# Patient Record
Sex: FEMALE | Race: WHITE | Employment: OTHER | URBAN - METROPOLITAN AREA
[De-identification: names, ages, dates, MRNs, and addresses within clinical notes are randomized per-mention and may not be internally consistent; named-entity substitution may affect disease eponyms.]

---

## 2017-03-28 DIAGNOSIS — F41.9 ANXIETY: ICD-10-CM

## 2017-03-29 RX ORDER — PAROXETINE HYDROCHLORIDE 20 MG/1
TABLET, FILM COATED ORAL
Qty: 90 TAB | Refills: 1 | Status: SHIPPED | OUTPATIENT
Start: 2017-03-29 | End: 2017-11-05 | Stop reason: SDUPTHER

## 2017-10-23 ENCOUNTER — OFFICE VISIT (OUTPATIENT)
Dept: INTERNAL MEDICINE CLINIC | Age: 82
End: 2017-10-23

## 2017-10-23 VITALS
OXYGEN SATURATION: 97 % | WEIGHT: 139.38 LBS | SYSTOLIC BLOOD PRESSURE: 150 MMHG | BODY MASS INDEX: 30.07 KG/M2 | HEIGHT: 57 IN | DIASTOLIC BLOOD PRESSURE: 87 MMHG | RESPIRATION RATE: 18 BRPM | HEART RATE: 60 BPM | TEMPERATURE: 98.7 F
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
## 2017-10-23 DIAGNOSIS — R26.81 GAIT INSTABILITY: ICD-10-CM

## 2017-10-23 RX ORDER — ACETAMINOPHEN 500 MG
2000 TABLET ORAL DAILY
COMMUNITY
Start: 2017-10-23 | End: 2020-06-04

## 2017-10-23 NOTE — MR AVS SNAPSHOT
Visit Information Date & Time Provider Department Dept. Phone Encounter #  
 10/23/2017  1:15 PM Aurelia Patel MD [facility] [address] [id number] Follow-up Instructions Return if symptoms worsen or fail to improve. Upcoming Health Maintenance Date Due  
 MEDICARE YEARLY EXAM 4/12/2017 INFLUENZA AGE 9 TO ADULT 8/1/2017 GLAUCOMA SCREENING Q2Y 2/8/2018 DTaP/Tdap/Td series (2 - Td) 1/1/2019 Allergies as of 10/23/2017  Review Complete On: 10/23/2017 By: Deidra Fraga LPN Severity Noted Reaction Type Reactions Demerol [Meperidine]  12/23/2011    Other (comments) GI distress Doxycycline  12/23/2011    Hives Novocain [Procaine]  04/06/2015    Swelling Current Immunizations  Reviewed on 10/17/2016 Name Date Influenza Vaccine Whole 10/1/2010 Pneumococcal Conjugate (PCV-13) 4/12/2016 Pneumococcal Vaccine (Unspecified Type) 4/24/1993 TDAP Vaccine 1/1/2009 Not reviewed this visit You Were Diagnosed With   
  
 Codes Comments Fall (on) (from) other stairs and steps, initial encounter    -  Primary ICD-10-CM: W10. Richar Hough ICD-9-CM: E880.9 Gait instability     ICD-10-CM: R26.81 
ICD-9-CM: 407. 2 Vitals BP Pulse Temp Resp Height(growth percentile) Weight(growth percentile) 150/87 (BP 1 Location: Left arm, BP Patient Position: Sitting) 60 98.7 °F (37.1 °C) (Oral) 18 4' 8.89\" (1.445 m) 139 lb 6 oz (63.2 kg) SpO2 BMI OB Status Smoking Status 97% 30.28 kg/m2 Hysterectomy Never Smoker Vitals History BMI and BSA Data Body Mass Index Body Surface Area  
 30.28 kg/m 2 1.59 m 2 [facility]erred [facility]acy [facility] [address] [phone] Your Updated Medication List  
  
   
This list is accurate as of: 10/23/17  1:40 PM.  Always use your most recent med list.  
  
  
  
  
 acetaminophen 500 mg tablet Commonly known as:  TYLENOL Take  by mouth every six (6) hours as needed for Pain. calcium-cholecalciferol (d3) 600-125 mg-unit Tab Take  by mouth as needed. cyanocobalamin 1,000 mcg tablet Take 1,000 mcg by mouth as needed. ibuprofen 200 mg tablet Commonly known as:  MOTRIN Take  by mouth three (3) times daily as needed. OCUVEL PO Take  by mouth. PARoxetine 20 mg tablet Commonly known as:  PAXIL  
take 1 tablet by mouth once daily We Performed the Following REFERRAL TO OCCUPATIONAL THERAPY [REF53 Custom] REFERRAL TO PHYSICAL THERAPY [MEY24 Custom] Follow-up Instructions Return if symptoms worsen or fail to improve. Referral Information Referral ID Referred By Referred To  
  
 [id number] Romaine Portillo Not Available Visits Status Start Date End Date 1 New Request 10/23/17 10/23/18 If your referral has a status of pending review or denied, additional information will be sent to support the outcome of this decision. Referral ID Referred By Referred To  
 [id number] Romaine Portillo Not Available Visits Status Start Date End Date 1 New Request 10/23/17 10/23/18 If your referral has a status of pending review or denied, additional information will be sent to support the outcome of this decision. Introducing [facility]! Ary Garcia introduces SIM Digital patient portal. Now you can access parts of your medical record, email your doctor's office, and request medication refills online. 1. In your internet browser, go to [email or web address] 2. Click on the First Time User? Click Here link in the Sign In box. You will see the New Member Sign Up page. 3. Enter your [employer] Access Code exactly as it appears below. You will not need to use this code after youve completed the sign-up process.  If you do not sign up before the expiration date, you must request a new code. · [employer] Access Code: [email or web address] Expires: 1/21/2018  1:40 PM 
 
4. Enter the last four digits of your Social Security Number (xxxx) and Date of Birth (mm/dd/yyyy) as indicated and click Submit. You will be taken to the next sign-up page. 5. Create a [employer] [facility] ID. This will be your [employer] login ID and cannot be changed, so think of one that is secure and easy to remember. 6. Create a [employer] password. You can change your password at any time. 7. Enter your Password Reset Question and Answer. This can be used at a later time if you forget your password. 8. Enter your e-mail address. You will receive e-mail notification when new information is available in [address] 9. Click Sign Up. You can now view and download portions of your medical record. 10. Click the Download Summary menu link to download a portable copy of your medical information. If you have questions, please visit the Frequently Asked Questions section of the [employer] website. Remember, [employer] [facility] is NOT to be used for urgent needs. For medical emergencies, dial 911. Now available from your iPhone and Android! Please provide this summary of care documentation to your next provider. Your primary care clinician is listed as Yoel Forrest. If you have any questions after today's visit, please call [phone].

## 2017-10-23 NOTE — PROGRESS NOTES
HISTORY OF PRESENT ILLNESS  Giles Rossi is a 80 y.o. female. HPI  Problem follow up:  Giles Rossi returns for follow up visit regarding GLF. she was seen 5 days ago in [facility] ER diagnosed with same and treated with dermabond. Workup was significant for negative CT head, . Notes, labs, studies, imaging related to this problem during prior visit were not available . Since that visit, she has improved. she has been compliant with prescribed treatment. Residual symptoms include: no pain. Residual tenderness over laceration L temple,  New issues associated with this problem include: none. Patient Active Problem List   Diagnosis Code    Osteoarthritis M19.90    At risk for falls Z91.81    Hearing loss H91.90    Anxiety F41.9    H/O thyroid disease Z86.39    Abnormal MMSE F99    Osteopenia M85.80    Macular degeneration H35.30    DJD (degenerative joint disease), lumbar M47.816    Seasonal affective disorder (HCC) F33.9    Advanced care planning/counseling discussion Z71.89     Past Medical History:   Diagnosis Date    Arthritis     Asthma     allergies    History of fibromyalgia     Senile osteopenia     Spinal stenosis     Thyroid disease     hypothyroidism     Allergies   Allergen Reactions    Demerol [Meperidine] Other (comments)     GI distress    Doxycycline Hives    Novocain [Procaine] Swelling     Current Outpatient Prescriptions on File Prior to Visit   Medication Sig Dispense Refill    PARoxetine (PAXIL) 20 mg tablet take 1 tablet by mouth once daily 90 Tab 1    cyanocobalamin 1,000 mcg tablet Take 1,000 mcg by mouth as needed.  calcium-cholecalciferol, d3, 600-125 mg-unit tab Take  by mouth as needed.  acetaminophen (TYLENOL) 500 mg tablet Take  by mouth every six (6) hours as needed for Pain.  ibuprofen (MOTRIN) 200 mg tablet Take  by mouth three (3) times daily as needed. No current facility-administered medications on file prior to visit. Social History   Substance Use Topics    Smoking status: Never Smoker    Smokeless tobacco: Never Used    Alcohol use No         ROS    Physical Exam   Constitutional: She appears well-developed and well-nourished. No distress. /87 (BP 1 Location: Left arm, BP Patient Position: Sitting)  Pulse 60  Temp 98.7 °F (37.1 °C) (Oral)   Resp 18  Ht 4' 8.89\" (1.445 m)  Wt 139 lb 6 oz (63.2 kg)  SpO2 97%  BMI 30.28 kg/m2Body mass index is 30.28 kg/(m^2). HENT:   Mouth/Throat: Oropharynx is clear and moist.   Neck: No JVD present. Carotid bruit is not present. Cardiovascular: Normal rate, regular rhythm, normal heart sounds and intact distal pulses. Pulmonary/Chest: Effort normal and breath sounds normal.   Musculoskeletal: She exhibits no edema. Neurological: She is alert. She displays a negative Romberg sign. Gait (slow/ cautious) abnormal.   Get up and go test - 15 sec. Able to stand from chair without using UE's. Skin: Skin is warm and dry. She is not diaphoretic. Nursing note and vitals reviewed. ASSESSMENT and PLAN  Diagnoses and all orders for this visit:    1. Fall (on) (from) other stairs and steps, initial encounter  -     REFERRAL TO PHYSICAL THERAPY  -     REFERRAL TO OCCUPATIONAL THERAPY  Increase VIT D intake. 2. Gait instability  -     REFERRAL TO PHYSICAL THERAPY  -     REFERRAL TO OCCUPATIONAL THERAPY      Follow-up Disposition:  Return if symptoms worsen or fail to improve.

## 2017-11-05 DIAGNOSIS — F41.9 ANXIETY: ICD-10-CM

## 2017-11-06 RX ORDER — PAROXETINE HYDROCHLORIDE 20 MG/1
TABLET, FILM COATED ORAL
Qty: 90 TAB | Refills: 1 | Status: SHIPPED | OUTPATIENT
Start: 2017-11-06 | End: 2018-06-15 | Stop reason: SDDI

## 2018-06-15 ENCOUNTER — OFFICE VISIT (OUTPATIENT)
Dept: INTERNAL MEDICINE CLINIC | Age: 83
End: 2018-06-15

## 2018-06-15 VITALS
TEMPERATURE: 97.9 F | HEIGHT: 57 IN | RESPIRATION RATE: 18 BRPM | DIASTOLIC BLOOD PRESSURE: 77 MMHG | OXYGEN SATURATION: 96 % | SYSTOLIC BLOOD PRESSURE: 152 MMHG | BODY MASS INDEX: 28.26 KG/M2 | HEART RATE: 68 BPM | WEIGHT: 131 LBS

## 2018-06-15 DIAGNOSIS — F41.9 ANXIETY: ICD-10-CM

## 2018-06-15 DIAGNOSIS — Z00.00 MEDICARE ANNUAL WELLNESS VISIT, SUBSEQUENT: Primary | ICD-10-CM

## 2018-06-15 DIAGNOSIS — K59.00 CONSTIPATION, UNSPECIFIED CONSTIPATION TYPE: ICD-10-CM

## 2018-06-15 DIAGNOSIS — Z91.81 AT RISK FOR FALLS: ICD-10-CM

## 2018-06-15 DIAGNOSIS — H91.13 PRESBYCUSIS OF BOTH EARS: ICD-10-CM

## 2018-06-15 DIAGNOSIS — Z78.0 POSTMENOPAUSAL: ICD-10-CM

## 2018-06-15 DIAGNOSIS — M85.80 OSTEOPENIA, UNSPECIFIED LOCATION: ICD-10-CM

## 2018-06-15 DIAGNOSIS — R45.4 IRRITABILITY AND ANGER: ICD-10-CM

## 2018-06-15 RX ORDER — PAROXETINE 30 MG/1
30 TABLET, FILM COATED ORAL DAILY
Qty: 90 TAB | Refills: 1 | Status: SHIPPED | OUTPATIENT
Start: 2018-06-15 | End: 2018-07-19 | Stop reason: SDUPTHER

## 2018-06-15 RX ORDER — DOCUSATE SODIUM 100 MG/1
100 CAPSULE, LIQUID FILLED ORAL 2 TIMES DAILY
Qty: 60 CAP | Refills: 2 | COMMUNITY
Start: 2018-06-15

## 2018-06-15 NOTE — MR AVS SNAPSHOT
[facility] 
 
 
 [address] 
[phone] Patient: Karla Pinto MRN: [id number] SXC:[birth year] Visit Information Date & Time Provider Department Dept. Phone Encounter #  
 6/15/2018  2:15 PM Marce Mota MD [facility] [address] [id number] Follow-up Instructions Return in about 6 months (around 12/15/2018). Upcoming Health Maintenance Date Due  
 GLAUCOMA SCREENING Q2Y 2/8/2018 MEDICARE YEARLY EXAM 3/14/2018 Influenza Age 5 to Adult 8/1/2018 DTaP/Tdap/Td series (2 - Td) 1/1/2019 Allergies as of 6/15/2018  Review Complete On: 6/15/2018 By: Amando Gama LPN Severity Noted Reaction Type Reactions Demerol [Meperidine]  12/23/2011    Other (comments) GI distress Doxycycline  12/23/2011    Hives Novocain [Procaine]  04/06/2015    Swelling Current Immunizations  Reviewed on 10/17/2016 Name Date Influenza Vaccine Whole 10/1/2010 Pneumococcal Conjugate (PCV-13) 4/12/2016 Pneumococcal Vaccine (Unspecified Type) 4/24/1993 TDAP Vaccine 1/1/2009 Not reviewed this visit You Were Diagnosed With   
  
 Codes Comments Medicare annual wellness visit, subsequent    -  Primary ICD-10-CM: Z00.00 ICD-9-CM: V70.0 At risk for falls     ICD-10-CM: Z91.81 
ICD-9-CM: V15.88 Presbycusis of both ears     ICD-10-CM: H91.13 
ICD-9-CM: 388.01 Constipation, unspecified constipation type     ICD-10-CM: K59.00 ICD-9-CM: 564.00 Anxiety     ICD-10-CM: F41.9 ICD-9-CM: 300.00 Osteopenia, unspecified location     ICD-10-CM: M85.80 ICD-9-CM: 733.90 Irritability and anger     ICD-10-CM: R45.4 ICD-9-CM: 799.22 Postmenopausal     ICD-10-CM: Z78.0 ICD-9-CM: V49.81 Vitals BP Pulse Temp Resp Height(growth percentile) Weight(growth percentile)  152/77 (BP 1 Location: Left arm, BP Patient Position: Sitting) 68 97.9 °F (36.6 °C) (Oral) 18 4' 8.89\" (1.445 m) 131 lb (59.4 kg) SpO2 BMI OB Status Smoking Status 96% 28.46 kg/m2 Hysterectomy Never Smoker Vitals History BMI and BSA Data Body Mass Index Body Surface Area  
 28.46 kg/m 2 1.54 m 2 Your Updated Medication List  
  
   
This list is accurate as of 6/15/18  2:34 PM.  Always use your most recent med list.  
  
  
  
  
 acetaminophen 500 mg tablet Commonly known as:  TYLENOL Take  by mouth every six (6) hours as needed for Pain. calcium-cholecalciferol (d3) 600-125 mg-unit Tab Take  by mouth as needed. Cholecalciferol (Vitamin D3) 2,000 unit Cap capsule Commonly known as:  VITAMIN D3 Take 2,000 Units by mouth daily. cyanocobalamin 1,000 mcg tablet Take 1,000 mcg by mouth as needed. docusate sodium 100 mg capsule Commonly known as:  Grey Guppy Take 1 Cap by mouth two (2) times a day. ibuprofen 200 mg tablet Commonly known as:  MOTRIN Take  by mouth three (3) times daily as needed. OCUVEL PO Take  by mouth. PARoxetine 30 mg tablet Commonly known as:  PAXIL Take 1 Tab by mouth daily. Prescriptions Printed Refills PARoxetine (PAXIL) 30 mg tablet 1 Sig: Take 1 Tab by mouth daily. Class: Print Route: Oral  
  
Follow-up Instructions Return in about 6 months (around 12/15/2018). To-Do List   
 06/29/2018 Imaging:  DEXA BONE DENSITY STUDY AXIAL Introducing [facility]! Dwain Chi introduces Rapid Mobile patient portal. Now you can access parts of your medical record, email your doctor's office, and request medication refills online. 1. In your internet browser, go to [email or web address] 2. Click on the First Time User? Click Here link in the Sign In box. You will see the New Member Sign Up page. 3. Enter your [employer] Access Code exactly as it appears below.  You will not need to use this code after youve completed the sign-up process. If you do not sign up before the expiration date, you must request a new code. · [employer] Access Code: [email or web address] Expires: 9/13/2018  2:34 PM 
 
4. Enter the last four digits of your Social Security Number (xxxx) and Date of Birth (mm/dd/yyyy) as indicated and click Submit. You will be taken to the next sign-up page. 5. Create a [employer] ID. This will be your [employer] login ID and cannot be changed, so think of one that is secure and easy to remember. 6. Create a [employer] password. You can change your password at any time. 7. Enter your Password Reset Question and Answer. This can be used at a later time if you forget your password. 8. Enter your e-mail address. You will receive e-mail notification when new information is available in [address] 9. Click Sign Up. You can now view and download portions of your medical record. 10. Click the Download Summary menu link to download a portable copy of your medical information. If you have questions, please visit the Frequently Asked Questions section of the [employer] website. Remember, [employer] is NOT to be used for urgent needs. For medical emergencies, dial 911. Now available from your iPhone and Android! Please provide this summary of care documentation to your next provider. Your primary care clinician is listed as [facility]. If you have any questions after today's visit, please call [phone].

## 2018-06-15 NOTE — PROGRESS NOTES
HISTORY OF PRESENT ILLNESS  Irlanda Bneton is a 80 y.o. female. HPI  This is a Subsequent Medicare Annual Wellness Visit providing Personalized Prevention Plan Services (PPPS) (Performed 12 months after initial AWV and PPPS )    Patient Active Problem List   Diagnosis Code    Osteoarthritis M19.90    At risk for falls Z91.81    Hearing loss H91.90    Anxiety F41.9    H/O thyroid disease Z86.39    Abnormal MMSE F99    Osteopenia M85.80    Macular degeneration H35.30    DJD (degenerative joint disease), lumbar M47.816    Seasonal affective disorder ([address] F33.9    Advanced care planning/counseling discussion Z70.80    Presbycusis of both ears H91.13     Past Surgical History:   Procedure Laterality Date    HX HEENT  3/2008    cat. ext. O.DMaude Burn HX GRAHAM AND BSO  1984    cervical Ca     Social History     Social History    Marital status:      Spouse name: N/A    Number of children: N/A    Years of education: N/A     Occupational History    Not on file. Social History Main Topics    Smoking status: Never Smoker    Smokeless tobacco: Never Used    Alcohol use No    Drug use: No    Sexual activity: Not Currently     Other Topics Concern    Not on file     Social History Narrative     Family History   Problem Relation Age of Onset    Heart Disease Mother     Stroke Father      Current Outpatient Prescriptions   Medication Sig    docusate sodium (COLACE) 100 mg capsule Take 1 Cap by mouth two (2) times a day.  PARoxetine (PAXIL) 30 mg tablet Take 1 Tab by mouth daily.  FA/VIT C/E/ZINC/COPPER/LUT/TONI (OCUVEL PO) Take  by mouth.  Cholecalciferol, Vitamin D3, (VITAMIN D3) 2,000 unit cap capsule Take 2,000 Units by mouth daily.  cyanocobalamin 1,000 mcg tablet Take 1,000 mcg by mouth as needed.  calcium-cholecalciferol, d3, 600-125 mg-unit tab Take  by mouth as needed.  acetaminophen (TYLENOL) 500 mg tablet Take  by mouth every six (6) hours as needed for Pain.     ibuprofen (MOTRIN) 200 mg tablet Take  by mouth three (3) times daily as needed. No current facility-administered medications for this visit. Allergies   Allergen Reactions    Demerol [Meperidine] Other (comments)     GI distress    Doxycycline Hives    Novocain [Procaine] Swelling     Immunization History   Administered Date(s) Administered    Influenza Vaccine Whole 10/01/2010    Pneumococcal Conjugate (PCV-13) 04/12/2016    Pneumococcal Vaccine (Unspecified Type) 04/24/1993    TDAP Vaccine 01/01/2009       Depression scale assessment:  PHQ over the last two weeks 4/11/2016   Little interest or pleasure in doing things Not at all   Feeling down, depressed or hopeless Not at all   Total Score PHQ 2 0       Alcohol screening assessment  You do not drink alcohol or very rarely. Functional assessment/ safety  Hearing Loss   The patient wears hearing aids. - but not effective. She declines new hearing aids per daughter    Activities of Daily Living   Partial assistance. Requires assistance with: no ADLs    Fall Risk     Fall Risk Assessment, last 12 mths 10/23/2017   Able to walk? Yes   Fall in past 12 months? Yes   Fall with injury? Yes   Number of falls in past 12 months 1   Fall Risk Score 2       Abuse Risk:  Patient is not abused    Advanced Medical Directive screening:  Natalio Stover does not have an AMD on file in this chart. ( If not, She will provide for us to copy to chart or he has been counseled on the need for this to assist with decision making should they be incapacitated due to illness and the steps necessary to draw up this document.)    Health maintenance hx includes:  Exercise: not active. Diet: not attempting to follow a low fat, low cholesterol diet      Review of Systems   Constitutional: Negative for chills, fever, malaise/fatigue and weight loss. HENT: Negative. Respiratory: Negative. Cardiovascular: Negative.     Gastrointestinal: Positive for abdominal pain and constipation. Negative for blood in stool, diarrhea, heartburn, nausea and vomiting. Genitourinary: Negative. Musculoskeletal: Positive for falls. Negative for back pain, joint pain and neck pain. Psychiatric/Behavioral: Negative for depression. Physical Exam   Constitutional: She appears well-developed and well-nourished. No distress. /77 (BP 1 Location: Left arm, BP Patient Position: Sitting)  Pulse 68  Temp 97.9 °F (36.6 °C) (Oral)   Resp 18  Ht 4' 8.89\" (1.445 m)  Wt 131 lb (59.4 kg)  SpO2 96%  BMI 28.46 kg/m2Body mass index is 28.46 kg/(m^2). HENT:   Mouth/Throat: Oropharynx is clear and moist.   Neck: No JVD present. Carotid bruit is not present. Cardiovascular: Normal rate, regular rhythm, normal heart sounds and intact distal pulses. Pulmonary/Chest: Effort normal. No accessory muscle usage. No respiratory distress. She has decreased breath sounds. She has no wheezes. She has no rhonchi. She has no rales. Abdominal: Soft. Normal appearance. She exhibits no distension and no mass. Bowel sounds are decreased. There is no hepatosplenomegaly. There is no tenderness. There is no rigidity and no guarding. Musculoskeletal: She exhibits no edema. Neurological: She is alert. Skin: Skin is warm and dry. She is not diaphoretic. Nursing note and vitals reviewed. ASSESSMENT and PLAN    ICD-10-CM ICD-9-CM    1. Medicare annual wellness visit, jan Arriazaalida was counseled on age-appropriate/ guideline-based risk prevention behaviors and screening for a 80y.o. year old   female . We also discussed adjustments in screening based on family history if necessary. Printed instructions for preventative screening guidelines and healthy behaviors given to patient with after visit summary. Z00.00 V70.0    2. At risk for falls- encouraged faithful use of rolling walker. She declined OT/ PT consult in home Z91.81 V15.88    3.  Presbycusis of both ears -she declines further management H91.13 388.01    4. Constipation, unspecified constipation type -mild , no abdominal findings on exam K59.00 564.00 docusate sodium (COLACE) 100 mg capsule   5. Anxiety F41.9 300.00    6. Osteopenia, unspecified location M85.80 733.90 DEXA BONE DENSITY STUDY AXIAL   7. Irritability and anger - some persistant symptoms per family. Will increase SSRI R45.4 799.22 PARoxetine (PAXIL) 30 mg tablet   8. Postmenopausal Z78.0 V49.81      Follow-up Disposition:  Return in about 6 months (around 12/15/2018).

## 2018-07-19 DIAGNOSIS — R45.4 IRRITABILITY AND ANGER: ICD-10-CM

## 2018-07-19 DIAGNOSIS — F41.9 ANXIETY: ICD-10-CM

## 2018-07-19 RX ORDER — PAROXETINE HYDROCHLORIDE 20 MG/1
TABLET, FILM COATED ORAL
Qty: 90 TAB | Refills: 1 | OUTPATIENT
Start: 2018-07-19

## 2018-07-19 RX ORDER — PAROXETINE 30 MG/1
30 TABLET, FILM COATED ORAL DAILY
Qty: 90 TAB | Refills: 1 | Status: SHIPPED | OUTPATIENT
Start: 2018-07-19 | End: 2018-07-21 | Stop reason: SINTOL

## 2018-07-20 ENCOUNTER — TELEPHONE (OUTPATIENT)
Dept: INTERNAL MEDICINE CLINIC | Age: 83
End: 2018-07-20

## 2018-07-20 DIAGNOSIS — F41.9 ANXIETY: ICD-10-CM

## 2018-07-20 NOTE — TELEPHONE ENCOUNTER
Per the Pharmacist patient wants to go back to the PARoxetine (PAXIL) 30 mg tablet 20 mg because the 30 mg makes her sick.  [facility] / [address]aky [address]ustin at [phone]

## 2018-07-20 NOTE — TELEPHONE ENCOUNTER
Daughter stated patient feeling like she's coming out of her skin and want to try a lower dose. Currently on PARoxetine (PAXIL) 30 mg tablet.

## 2018-07-20 NOTE — TELEPHONE ENCOUNTER
----- Message from Devon Michele sent at 7/20/2018 11:03 AM EDT -----  Regarding: Dr. Damien Aguila, daughter, is requesting a Rx for pt's nerve medication (name not disclosed) to be sent to [facility] on file. Pt states she would like the dx be switched from 30 mg to 20mg. Best contact [phone].

## 2018-07-20 NOTE — TELEPHONE ENCOUNTER
Please call the patient or Daughter Monday Morning regarding the different dosage of her PARoxetine (PAXIL) 30 mg tablet  Daughter is up set  No is [phone] it to the [address] at [phone]

## 2018-07-21 RX ORDER — PAROXETINE HYDROCHLORIDE 20 MG/1
20 TABLET, FILM COATED ORAL DAILY
Qty: 30 TAB | Refills: 5 | Status: SHIPPED | OUTPATIENT
Start: 2018-07-21 | End: 2018-09-14 | Stop reason: SDUPTHER

## 2018-07-21 RX ORDER — PAROXETINE HYDROCHLORIDE 20 MG/1
TABLET, FILM COATED ORAL
Qty: 90 TAB | Refills: 1 | Status: SHIPPED | OUTPATIENT
Start: 2018-07-21 | End: 2019-09-03 | Stop reason: ALTCHOICE

## 2018-07-21 NOTE — TELEPHONE ENCOUNTER
Ok, will lower paxil to 20mg/ day. New medication or Refill was escribed to patient's pharmacy as requested. Let pt know.

## 2018-09-14 ENCOUNTER — OFFICE VISIT (OUTPATIENT)
Dept: INTERNAL MEDICINE CLINIC | Age: 83
End: 2018-09-14

## 2018-09-14 VITALS
SYSTOLIC BLOOD PRESSURE: 146 MMHG | HEIGHT: 56 IN | DIASTOLIC BLOOD PRESSURE: 76 MMHG | WEIGHT: 130 LBS | RESPIRATION RATE: 18 BRPM | BODY MASS INDEX: 29.25 KG/M2 | OXYGEN SATURATION: 96 % | HEART RATE: 62 BPM | TEMPERATURE: 98.2 F

## 2018-09-14 DIAGNOSIS — R29.6 FREQUENT FALLS: ICD-10-CM

## 2018-09-14 DIAGNOSIS — K59.09 CHRONIC CONSTIPATION: ICD-10-CM

## 2018-09-14 DIAGNOSIS — R10.9 LEFT LATERAL ABDOMINAL PAIN: Primary | ICD-10-CM

## 2018-09-14 RX ORDER — POLYETHYLENE GLYCOL 3350 17 G/17G
17 POWDER, FOR SOLUTION ORAL DAILY
Qty: 225 G | Refills: 3
Start: 2018-09-14 | End: 2020-06-04

## 2018-09-14 NOTE — MR AVS SNAPSHOT
[facility] 
 
 
 [address] [place] [address] [facility] 
[phone] Patient: Jessi Quiñonez MRN: [id number] Visit Information Date & Time Provider Department Dept. Phone Encounter #  
 9/14/2018  2:15 PM Matthew Marrero MD [facility] [address] [id number] Upcoming Health Maintenance Date Due  
 GLAUCOMA SCREENING Q2Y 2/8/2018 Influenza Age 5 to Adult 8/1/2018 DTaP/Tdap/Td series (2 - Td) 1/1/2019 MEDICARE YEARLY EXAM 6/16/2019 Allergies as of 9/14/2018  Review Complete On: 6/15/2018 By: Jeferson Carrero LPN Severity Noted Reaction Type Reactions Demerol [Meperidine]  12/23/2011    Other (comments) GI distress Doxycycline  12/23/2011    Hives Novocain [Procaine]  04/06/2015    Swelling Current Immunizations  Reviewed on 10/17/2016 Name Date Influenza Vaccine Whole 10/1/2010 Pneumococcal Conjugate (PCV-13) 4/12/2016 Pneumococcal Vaccine (Unspecified Type) 4/24/1993 TDAP Vaccine 1/1/2009 Not reviewed this visit You Were Diagnosed With   
  
 Codes Comments Left lateral abdominal pain    -  Primary ICD-10-CM: R10.9 ICD-9-CM: 789.09 Chronic constipation     ICD-10-CM: K59.09 
ICD-9-CM: 564.00 Frequent falls     ICD-10-CM: R29.6 ICD-9-CM: V15.88 Vitals BP Pulse Temp Resp Height(growth percentile) Weight(growth percentile) 146/76 (BP 1 Location: Left arm, BP Patient Position: Sitting) 62 98.2 °F (36.8 °C) (Oral) 18 4' 8\" (1.422 m) 130 lb (59 kg) SpO2 BMI OB Status Smoking Status 96% 29.15 kg/m2 Hysterectomy Never Smoker Vitals History BMI and BSA Data Body Mass Index Body Surface Area  
 29.15 kg/m 2 1.53 m 2 Preferred Pharmacy Pharmacy Name Phone [facility] [address] [facility] [phone] Your Updated Medication List  
  
   
 This list is accurate as of 9/14/18  3:00 PM.  Always use your most recent med list.  
  
  
  
  
 acetaminophen 500 mg tablet Commonly known as:  TYLENOL Take  by mouth every six (6) hours as needed for Pain. calcium-cholecalciferol (d3) 600-125 mg-unit Tab Take  by mouth as needed. Cholecalciferol (Vitamin D3) 2,000 unit Cap capsule Commonly known as:  VITAMIN D3 Take 2,000 Units by mouth daily. cyanocobalamin 1,000 mcg tablet Take 1,000 mcg by mouth as needed. docusate sodium 100 mg capsule Commonly known as:  [place] Take 1 Cap by mouth two (2) times a day. ibuprofen 200 mg tablet Commonly known as:  MOTRIN Take  by mouth three (3) times daily as needed. OCUVEL PO Take  by mouth. PARoxetine 20 mg tablet Commonly known as:  PAXIL  
take 1 tablet by mouth once daily  
  
 polyethylene glycol 17 gram/dose powder Commonly known as:  Corrine El Take 17 g by mouth daily. Introducing \[facility]\"" & [facility] SERVICES! [employer] introduces [facility] patient portal. Now you can access parts of your medical record, email your doctor's office, and request medication refills online. 1. In your internet browser, go to [email or web address] 2. Click on the First Time User? Click Here link in the Sign In box. You will see the New Member Sign Up page. 3. Enter your [facility] Access Code exactly as it appears below. You will not need to use this code after youve completed the sign-up process. If you do not sign up before the expiration date, you must request a new code. · [facility] Access Code: [email or web address] Expires: 12/13/2018  3:00 PM 
 
4. Enter the last four digits of your Social Security Number (xxxx) and Date of Birth (mm/dd/yyyy) as indicated and click Submit. You will be taken to the next sign-up page. 5. Create a [facility] ID.  This will be your [facility] login ID and cannot be changed, so think of one that is secure and easy to remember. 6. Create a [facility] password. You can change your password at any time. 7. Enter your Password Reset Question and Answer. This can be used at a later time if you forget your password. 8. Enter your e-mail address. You will receive e-mail notification when new information is available in [address] 9. Click Sign Up. You can now view and download portions of your medical record. 10. Click the Download Summary menu link to download a portable copy of your medical information. If you have questions, please visit the Frequently Asked Questions section of the [facility] website. Remember, [facility] is NOT to be used for urgent needs. For medical emergencies, dial 911. Now available from your iPhone and Android! Please provide this summary of care documentation to your next provider. Your primary care clinician is listed as Mace Presser. If you have any questions after today's visit, please call [phone].

## 2018-09-14 NOTE — PROGRESS NOTES
HISTORY OF PRESENT ILLNESS Chief Complaint Patient presents with  Side Pain L sided pain Patient of Dr. Reuben Nj. Is here with her daughter. She is very hard of hearing. Presents with left lateral abdominal pain for years. It is mild and intermittent. Reports chronic constipation. Takes OTC colace. Has daily small BM but not yesterday. Daughter reports growling of stomach. Has a hx of several fall and may have landed on this time. Last fall was weeks ago. Colonoscopy  or so was normal.  Sister  of colon cancer. Weight Is stable Wt Readings from Last 3 Encounters:  
18 130 lb (59 kg) 06/15/18 131 lb (59.4 kg) 10/23/17 139 lb 6 oz (63.2 kg) CT  2. Cholelithiasis. 3. Partly calcified 2.5 x 2.1 cm soft tissue mass separate from and adjacent  
to the rectum and coccyx of uncertain etiology or significance. Correlation  
with any prior abdominal or pelvic imaging to document stability is  
recommended. Otherwise short-term followup recommended. MRI may be helpful  
for further characterization. Review of Systems All other systems reviewed and are negative, except as noted in HPI Past Medical and Surgical History 
 has a past medical history of Arthritis; Asthma; History of fibromyalgia; Senile osteopenia; Spinal stenosis; and Thyroid disease. has a past surgical history that includes hx heent (3/2008) and hx mesfin and bso (). reports that she has never smoked. She has never used smokeless tobacco. She reports that she does not drink alcohol or use illicit drugs. family history includes Heart Disease in her mother; Stroke in her father. Physical Exam  
Nursing note and vitals reviewed. Blood pressure 146/76, pulse 62, temperature 98.2 °F (36.8 °C), temperature source Oral, resp. rate 18, height 4' 8\" (1.422 m), weight 130 lb (59 kg), SpO2 96 %. Constitutional:  No distress.    
Eyes: Conjunctivae are normal.  
 Ears:  Hearing grossly intact Cardiovascular: Normal rate. regular rhythm, no murmurs or gallops No edema Pulmonary/Chest: Effort normal.   CTAB Musculoskeletal: moves all 4 extremities Abdomen is soft, nontender, mildly hyperactive bowel sounds, no significant ecchymosis or mass of the left side. Neurological: Alert and oriented to person, place, and time. Skin: No rash noted. Psychiatric: Normal mood and affect. Behavior is normal.  
 
ASSESSMENT and PLAN Diagnoses and all orders for this visit: 1. Left lateral abdominal pain 
 this is a recurrent chronic issue. She does not have any alarm symptoms of blood in the stool, weakness, weight loss. CT scan in 2013 with possible rectal mass, but I would think if this was cancer things would have progressed by now. No recent colonoscopy and that would be risky in this 19-year-old woman who is frail. Suspect this is constipation. Her daughter agrees. Will start MiraLAX. Monitor. Consider repeating scan or fit test.  Last visit with colonoscopy unless alarm symptoms develop. 2. Chronic constipation -     polyethylene glycol (MIRALAX) 17 gram/dose powder; Take 17 g by mouth daily. 3. Frequent falls 
 
 
lab results and schedule of future lab studies reviewed with patient 
reviewed medications and side effects in detail Return to clinic for further evaluation if new symptoms develop Current Outpatient Prescriptions Medication Sig  polyethylene glycol (MIRALAX) 17 gram/dose powder Take 17 g by mouth daily.  PARoxetine (PAXIL) 20 mg tablet take 1 tablet by mouth once daily  docusate sodium (COLACE) 100 mg capsule Take 1 Cap by mouth two (2) times a day.  FA/VIT C/E/ZINC/COPPER/LUT/TONI (OCUVEL PO) Take  by mouth.  Cholecalciferol, Vitamin D3, (VITAMIN D3) 2,000 unit cap capsule Take 2,000 Units by mouth daily.  cyanocobalamin 1,000 mcg tablet Take 1,000 mcg by mouth as needed.  calcium-cholecalciferol, d3, 600-125 mg-unit tab Take  by mouth as needed.  acetaminophen (TYLENOL) 500 mg tablet Take  by mouth every six (6) hours as needed for Pain.  ibuprofen (MOTRIN) 200 mg tablet Take  by mouth three (3) times daily as needed. No current facility-administered medications for this visit.

## 2018-12-06 ENCOUNTER — OFFICE VISIT (OUTPATIENT)
Dept: INTERNAL MEDICINE CLINIC | Age: 83
End: 2018-12-06

## 2018-12-06 VITALS
TEMPERATURE: 97.5 F | RESPIRATION RATE: 18 BRPM | SYSTOLIC BLOOD PRESSURE: 149 MMHG | HEART RATE: 59 BPM | WEIGHT: 130 LBS | DIASTOLIC BLOOD PRESSURE: 80 MMHG | BODY MASS INDEX: 29.25 KG/M2 | HEIGHT: 56 IN | OXYGEN SATURATION: 93 %
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
## 2018-12-06 DIAGNOSIS — R26.81 GAIT INSTABILITY: ICD-10-CM

## 2018-12-06 NOTE — PROGRESS NOTES
HISTORY OF PRESENT ILLNESS Michelle Vital is a 80 y.o. female. HPI Problem visit: 
Michelle Vital is here for complaint of GLF at mailbox 7 days ago. . 
Problem began 1 week(s) ago. Severity is moderate Character of problem: fell forward GLF. No pain until 1 day later. Mild bruise R lower back. Mild R flank pain now. nothing makes the problem worse. nothing makes the problem better. Associated symptoms include: she feels legs give way sometimes. She has long hx of spinal stenosis and chronic low back pain, neurogenic claudication symptoms. Usually uses walker but not always. Treatments tried include: medication not used ROS Physical Exam  
Musculoskeletal:  
     Lumbar back: She exhibits decreased range of motion, tenderness and pain. She exhibits no swelling, no edema, no deformity and no spasm. Back: 
 
Neurological: She has normal strength. No sensory deficit. She exhibits normal muscle tone (LEs). Gait abnormal. Coordination normal.  
Ambulates with stability using walker. Visit Vitals /80 (BP 1 Location: Left arm, BP Patient Position: Sitting) Pulse (!) 59 Temp 97.5 °F (36.4 °C) (Oral) Resp 18 Ht 4' 8\" (1.422 m) Wt 130 lb (59 kg) SpO2 93% BMI 29.15 kg/m² ASSESSMENT and PLAN Diagnoses and all orders for this visit: 
 
1. Fall, initial encounter -likely related to gait instability/ spinal stenosis. No severe injuries with this fall. We discussed safety issues, using walker ALWAYS. Refer to PT for gait stability exercises 
-     REFERRAL TO PHYSICAL THERAPY 2. Gait instability 
-     REFERRAL TO PHYSICAL THERAPY Follow-up Disposition: 
Return if symptoms worsen or fail to improve.

## 2019-01-16 ENCOUNTER — PATIENT OUTREACH (OUTPATIENT)
Dept: INTERNAL MEDICINE CLINIC | Age: 84
End: 2019-01-16

## 2019-01-16 ENCOUNTER — TELEPHONE (OUTPATIENT)
Dept: INTERNAL MEDICINE CLINIC | Age: 84
End: 2019-01-16

## 2019-01-16 NOTE — TELEPHONE ENCOUNTER
Per Patient's daughter Ricky Raymond,   Pt  needs a new anxiety medication , states she can  when she stops and starts again she starts becoming cloudy. States patient is not anxious everyday  so she doesn't need it daily. Patient is currently feeling anxious but doesn't want to take the medication.  Ricky Raymond can be reached at [phone], alessio is with patient and was given permission to speak

## 2019-01-16 NOTE — PROGRESS NOTES
1/16/2019 Daughter Dillon Castillo is with patient today and reports patient is anxious. Reports patient has not been taking Paxil consistently and daughter is requesting a different medication for her that she may take prn. This daughter Dillon Castillo is not on HIPPA form but patient gave verbal permission to speak with her. NN informed her that she most likely would need to see an MD since her PCP is not in the office at this time.  PAC

## 2019-01-17 ENCOUNTER — OFFICE VISIT (OUTPATIENT)
Dept: INTERNAL MEDICINE CLINIC | Age: 84
End: 2019-01-17

## 2019-01-17 ENCOUNTER — HOSPITAL ENCOUNTER (OUTPATIENT)
Dept: LAB | Age: 84
Discharge: HOME | End: 2019-01-17
Payer: MEDICARE

## 2019-01-17 VITALS
WEIGHT: 126.4 LBS | RESPIRATION RATE: 12 BRPM | TEMPERATURE: 97.4 F | SYSTOLIC BLOOD PRESSURE: 136 MMHG | DIASTOLIC BLOOD PRESSURE: 80 MMHG | OXYGEN SATURATION: 97 % | HEART RATE: 68 BPM | HEIGHT: 56 IN | BODY MASS INDEX: 28.43 KG/M2

## 2019-01-17 DIAGNOSIS — F41.9 ANXIETY: ICD-10-CM

## 2019-01-17 DIAGNOSIS — R41.0 DISORIENTATION: Primary | ICD-10-CM

## 2019-01-17 DIAGNOSIS — E53.8 B12 DEFICIENCY DUE TO DIET: ICD-10-CM

## 2019-01-17 DIAGNOSIS — E55.9 VITAMIN D DEFICIENCY: ICD-10-CM

## 2019-01-17 PROCEDURE — 82607 VITAMIN B-12: CPT

## 2019-01-17 PROCEDURE — 81003 URINALYSIS AUTO W/O SCOPE: CPT

## 2019-01-17 PROCEDURE — 36415 COLL VENOUS BLD VENIPUNCTURE: CPT

## 2019-01-17 PROCEDURE — 82306 VITAMIN D 25 HYDROXY: CPT

## 2019-01-17 PROCEDURE — 84443 ASSAY THYROID STIM HORMONE: CPT

## 2019-01-17 PROCEDURE — 85027 COMPLETE CBC AUTOMATED: CPT

## 2019-01-17 PROCEDURE — 87086 URINE CULTURE/COLONY COUNT: CPT

## 2019-01-17 PROCEDURE — 80053 COMPREHEN METABOLIC PANEL: CPT

## 2019-01-17 RX ORDER — BUSPIRONE HYDROCHLORIDE 5 MG/1
5 TABLET ORAL DAILY
Qty: 30 TAB | Refills: 0 | Status: SHIPPED | OUTPATIENT
Start: 2019-01-17 | End: 2019-09-03 | Stop reason: ALTCHOICE

## 2019-01-17 NOTE — PROGRESS NOTES
Chief Complaint Patient presents with  Anxiety  Medication Problem  
  concerns with Paxil rx, wants PRN anxiety meds Patient presents with her daughter who speaks letter to patient so she can hear. Disorientation Per daughter patient had a period of increased disorientation which was out of the norm for her. Patient's daughter notes that patient did not remember her visiting and also seemed very disoriented. The daughter notes that her mentation has normalized. Daughter notes that when she called she was also waking up from a nap. Anxiety Patient reports she was on Prozac many years ago and currently on Paxil but has not been taking regularly. She reports sometimes she cannot remember to take it. She reports she has anxiety pretty consistently and has for many years. She reports she gets more anxious in the morning and daughter agrees. Patient reports she eats pretty well 3 times a day. She does not sleep very well and takes a nap during the daytime. She reports that she had a thyroid disorder many years ago but was taken off her thyroid medicine because it normalized. Patient will like to have her thyroid checked too Past Medical History:  
Diagnosis Date  Arthritis  Asthma   
 allergies  History of fibromyalgia  Senile osteopenia  Spinal stenosis  Thyroid disease   
 hypothyroidism Past Surgical History:  
Procedure Laterality Date  HX HEENT  3/2008  
 cat. ext. O.DClive Free HX GRAHAM AND BSO  1984  
 cervical Ca Social History Socioeconomic History  Marital status:  Spouse name: Not on file  Number of children: Not on file  Years of education: Not on file  Highest education level: Not on file Tobacco Use  Smoking status: Never Smoker  Smokeless tobacco: Never Used Substance and Sexual Activity  Alcohol use: No  
 Drug use: No  
 Sexual activity: Not Currently Family History Problem Relation Age of Onset  Heart Disease Mother  Stroke Father Current Outpatient Medications Medication Sig Dispense Refill  busPIRone (BUSPAR) 5 mg tablet Take 1 Tab by mouth daily. 30 Tab 0  
 polyethylene glycol (MIRALAX) 17 gram/dose powder Take 17 g by mouth daily. 225 g 3  
 PARoxetine (PAXIL) 20 mg tablet take 1 tablet by mouth once daily 90 Tab 1  
 docusate sodium (COLACE) 100 mg capsule Take 1 Cap by mouth two (2) times a day. 60 Cap 2  
 FA/VIT C/E/ZINC/COPPER/LUT/TONI (OCUVEL PO) Take  by mouth.  Cholecalciferol, Vitamin D3, (VITAMIN D3) 2,000 unit cap capsule Take 2,000 Units by mouth daily.  cyanocobalamin 1,000 mcg tablet Take 1,000 mcg by mouth as needed.  calcium-cholecalciferol, d3, 600-125 mg-unit tab Take  by mouth as needed.  acetaminophen (TYLENOL) 500 mg tablet Take  by mouth every six (6) hours as needed for Pain.  ibuprofen (MOTRIN) 200 mg tablet Take  by mouth three (3) times daily as needed. Allergies Allergen Reactions  Demerol [Meperidine] Other (comments) GI distress  Doxycycline Hives  Novocain [Procaine] Swelling Review of Systems - General ROS: negative for - chills, fatigue, fever, hot flashes or malaise Cardiovascular ROS: no chest pain or dyspnea on exertion Respiratory ROS: no cough, shortness of breath, or wheezing Visit Vitals /80 (BP 1 Location: Left arm, BP Patient Position: Sitting) Pulse 68 Temp 97.4 °F (36.3 °C) (Oral) Resp 12 Ht 4' 8\" (1.422 m) Wt 126 lb 6.4 oz (57.3 kg) SpO2 97% BMI 28.34 kg/m² General Appearance:  Well developed, well nourished,alert and oriented x 3, and individual in no acute distress. Ears/Nose/Mouth/Throat:   Hearing grossly normal. 
  
    Neck: Supple, no lad, no bruits Chest:   Lungs clear to auscultation bilaterally. Cardiovascular:  Regular rate and rhythm, S1, S2 normal, no murmur. Abdomen:   Soft, non-tender, bowel sounds are active. Extremities: No edema bilaterally. Skin: Warm and dry, no suspicious lesions Diagnoses and all orders for this visit: 1. Disorientation New symptoms Discussed with daughter and may have been due to waking from deep sleep. Given new symptoms will check labs to ensure she does not have any underlying issue 
-     URINALYSIS W/ RFLX MICROSCOPIC 
-     CULTURE, URINE 
-     CBC W/O DIFF 
-     METABOLIC PANEL, COMPREHENSIVE 
-     TSH 3RD GENERATION 
-     VITAMIN B12 
 
2. Vitamin D deficiency Replete as needed 
 
-     VITAMIN D, 25 HYDROXY 3. Anxiety Per daughter patient had side effects to Paxil. She would like to take something as needed or daily. Discussed with patient and daughter regarding benefits of BuSpar and side effects as well. She will start with a low dose at 2.5 mg and increase to 5 mg as needed she may need to increase to twice daily to 3 times daily but will take it slowly 
-     busPIRone (BUSPAR) 5 mg tablet; Take 1 Tab by mouth daily. 4. B12 deficiency due to diet Patient reports she is having some numbness and tingling of her fingers -     VITAMIN B12

## 2019-01-19 LAB
25(OH)D3+25(OH)D2 SERPL-MCNC: 35.1 NG/ML (ref 30–100)
ALBUMIN SERPL-MCNC: 4.1 G/DL (ref 3.2–4.6)
ALBUMIN/GLOB SERPL: 1.5 {RATIO} (ref 1.2–2.2)
ALP SERPL-CCNC: 72 IU/L (ref 39–117)
ALT SERPL-CCNC: 11 IU/L (ref 0–32)
APPEARANCE UR: CLEAR
AST SERPL-CCNC: 22 IU/L (ref 0–40)
BACTERIA UR CULT: NORMAL
BILIRUB SERPL-MCNC: 0.5 MG/DL (ref 0–1.2)
BILIRUB UR QL STRIP: NEGATIVE
BUN SERPL-MCNC: 19 MG/DL (ref 10–36)
BUN/CREAT SERPL: 25 (ref 12–28)
CALCIUM SERPL-MCNC: 9.4 MG/DL (ref 8.7–10.3)
CHLORIDE SERPL-SCNC: 104 MMOL/L (ref 96–106)
CO2 SERPL-SCNC: 23 MMOL/L (ref 20–29)
COLOR UR: YELLOW
CREAT SERPL-MCNC: 0.75 MG/DL (ref 0.57–1)
ERYTHROCYTE [DISTWIDTH] IN BLOOD BY AUTOMATED COUNT: 13.2 % (ref 12.3–15.4)
GLOBULIN SER CALC-MCNC: 2.7 G/DL (ref 1.5–4.5)
GLUCOSE SERPL-MCNC: 91 MG/DL (ref 65–99)
GLUCOSE UR QL: NEGATIVE
HCT VFR BLD AUTO: 35.8 % (ref 34–46.6)
HGB BLD-MCNC: 11.8 G/DL (ref 11.1–15.9)
HGB UR QL STRIP: NEGATIVE
KETONES UR QL STRIP: ABNORMAL
LEUKOCYTE ESTERASE UR QL STRIP: NEGATIVE
MCH RBC QN AUTO: 31.5 PG (ref 26.6–33)
MCHC RBC AUTO-ENTMCNC: 33 G/DL (ref 31.5–35.7)
MCV RBC AUTO: 96 FL (ref 79–97)
MICRO URNS: ABNORMAL
NITRITE UR QL STRIP: NEGATIVE
PH UR STRIP: 6.5 [PH] (ref 5–7.5)
PLATELET # BLD AUTO: 275 X10E3/UL (ref 150–379)
POTASSIUM SERPL-SCNC: 4.4 MMOL/L (ref 3.5–5.2)
PROT SERPL-MCNC: 6.8 G/DL (ref 6–8.5)
PROT UR QL STRIP: NEGATIVE
RBC # BLD AUTO: 3.75 X10E6/UL (ref 3.77–5.28)
SODIUM SERPL-SCNC: 144 MMOL/L (ref 134–144)
SP GR UR: 1.02 (ref 1–1.03)
TSH SERPL DL<=0.005 MIU/L-ACNC: 3.24 UIU/ML (ref 0.45–4.5)
UROBILINOGEN UR STRIP-MCNC: 0.2 MG/DL (ref 0.2–1)
VIT B12 SERPL-MCNC: 735 PG/ML (ref 232–1245)
WBC # BLD AUTO: 6.5 X10E3/UL (ref 3.4–10.8)

## 2019-01-29 ENCOUNTER — TELEPHONE (OUTPATIENT)
Dept: INTERNAL MEDICINE CLINIC | Age: 84
End: 2019-01-29

## 2019-01-29 NOTE — TELEPHONE ENCOUNTER
Patient's daughter states patient has developed a cold , states patient has been a lot more relaxed than she should be due  to taking cold medication and her anti anxiety medication, Patient's daughter wants to speak with the nurse to ask if taking 1/2 of buspar would be okay or not taking it at all would be okay until patient gets rid of her cold.     She can be reached at [phone]

## 2019-01-30 NOTE — TELEPHONE ENCOUNTER
I advised Sara Hernandez, per Dr Jorge Luis Jimenez, for pt to stop Buspar until she is feeling better.  She voices understanding and thanks

## 2019-04-09 ENCOUNTER — TELEPHONE (OUTPATIENT)
Dept: INTERNAL MEDICINE CLINIC | Age: 84
End: 2019-04-09

## 2019-04-09 DIAGNOSIS — M48.00 SPINAL STENOSIS, UNSPECIFIED SPINAL REGION: Primary | ICD-10-CM

## 2019-04-09 NOTE — TELEPHONE ENCOUNTER
Per patient's insurance no referral required.  On file at time of request. Medicare &  [employer] - thank you

## 2019-05-11 ENCOUNTER — HOSPITAL ENCOUNTER (EMERGENCY)
Age: 84
Discharge: HOME | End: 2019-05-11
Attending: EMERGENCY MEDICINE
Payer: MEDICARE

## 2019-05-11 VITALS
TEMPERATURE: 98.3 F | DIASTOLIC BLOOD PRESSURE: 63 MMHG | SYSTOLIC BLOOD PRESSURE: 131 MMHG | RESPIRATION RATE: 18 BRPM | OXYGEN SATURATION: 95 % | HEART RATE: 61 BPM

## 2019-05-11 DIAGNOSIS — R19.7 DIARRHEA, UNSPECIFIED TYPE: Primary | ICD-10-CM

## 2019-05-11 LAB
ALBUMIN SERPL-MCNC: 3 G/DL (ref 3.5–5)
ALBUMIN/GLOB SERPL: 0.9 {RATIO} (ref 1.1–2.2)
ALP SERPL-CCNC: 64 U/L (ref 45–117)
ALT SERPL-CCNC: 18 U/L (ref 12–78)
ANION GAP SERPL CALC-SCNC: 3 MMOL/L (ref 5–15)
APPEARANCE UR: CLEAR
AST SERPL-CCNC: 15 U/L (ref 15–37)
BACTERIA URNS QL MICRO: NEGATIVE /HPF
BASOPHILS # BLD: 0 K/UL (ref 0–0.1)
BASOPHILS NFR BLD: 0 % (ref 0–1)
BILIRUB SERPL-MCNC: 0.5 MG/DL (ref 0.2–1)
BILIRUB UR QL: NEGATIVE
BNP SERPL-MCNC: 395 PG/ML
BUN SERPL-MCNC: 18 MG/DL (ref 6–20)
BUN/CREAT SERPL: 23 (ref 12–20)
CALCIUM SERPL-MCNC: 8.3 MG/DL (ref 8.5–10.1)
CHLORIDE SERPL-SCNC: 108 MMOL/L (ref 97–108)
CO2 SERPL-SCNC: 25 MMOL/L (ref 21–32)
COLOR UR: ABNORMAL
CREAT SERPL-MCNC: 0.77 MG/DL (ref 0.55–1.02)
DIFFERENTIAL METHOD BLD: ABNORMAL
EOSINOPHIL # BLD: 0.1 K/UL (ref 0–0.4)
EOSINOPHIL NFR BLD: 1 % (ref 0–7)
EPITH CASTS URNS QL MICRO: ABNORMAL /LPF
ERYTHROCYTE [DISTWIDTH] IN BLOOD BY AUTOMATED COUNT: 13.3 % (ref 11.5–14.5)
GLOBULIN SER CALC-MCNC: 3.2 G/DL (ref 2–4)
GLUCOSE SERPL-MCNC: 105 MG/DL (ref 65–100)
GLUCOSE UR STRIP.AUTO-MCNC: NEGATIVE MG/DL
HCT VFR BLD AUTO: 33.4 % (ref 35–47)
HGB BLD-MCNC: 10.5 G/DL (ref 11.5–16)
HGB UR QL STRIP: NEGATIVE
HYALINE CASTS URNS QL MICRO: ABNORMAL /LPF (ref 0–5)
IMM GRANULOCYTES # BLD AUTO: 0 K/UL (ref 0–0.04)
IMM GRANULOCYTES NFR BLD AUTO: 0 % (ref 0–0.5)
KETONES UR QL STRIP.AUTO: NEGATIVE MG/DL
LEUKOCYTE ESTERASE UR QL STRIP.AUTO: NEGATIVE
LIPASE SERPL-CCNC: 161 U/L (ref 73–393)
LYMPHOCYTES # BLD: 0.5 K/UL (ref 0.8–3.5)
LYMPHOCYTES NFR BLD: 6 % (ref 12–49)
MCH RBC QN AUTO: 31.3 PG (ref 26–34)
MCHC RBC AUTO-ENTMCNC: 31.4 G/DL (ref 30–36.5)
MCV RBC AUTO: 99.7 FL (ref 80–99)
MONOCYTES # BLD: 0.9 K/UL (ref 0–1)
MONOCYTES NFR BLD: 10 % (ref 5–13)
MUCOUS THREADS URNS QL MICRO: ABNORMAL /LPF
NEUTS SEG # BLD: 7.4 K/UL (ref 1.8–8)
NEUTS SEG NFR BLD: 83 % (ref 32–75)
NITRITE UR QL STRIP.AUTO: NEGATIVE
NRBC # BLD: 0 K/UL (ref 0–0.01)
NRBC BLD-RTO: 0 PER 100 WBC
PH UR STRIP: 6 [PH] (ref 5–8)
PLATELET # BLD AUTO: 174 K/UL (ref 150–400)
PMV BLD AUTO: 10.6 FL (ref 8.9–12.9)
POTASSIUM SERPL-SCNC: 3.7 MMOL/L (ref 3.5–5.1)
PROT SERPL-MCNC: 6.2 G/DL (ref 6.4–8.2)
PROT UR STRIP-MCNC: ABNORMAL MG/DL
RBC # BLD AUTO: 3.35 M/UL (ref 3.8–5.2)
RBC #/AREA URNS HPF: ABNORMAL /HPF (ref 0–5)
RBC MORPH BLD: ABNORMAL
SODIUM SERPL-SCNC: 136 MMOL/L (ref 136–145)
SP GR UR REFRACTOMETRY: 1.01 (ref 1–1.03)
UROBILINOGEN UR QL STRIP.AUTO: 0.2 EU/DL (ref 0.2–1)
WBC # BLD AUTO: 8.9 K/UL (ref 3.6–11)
WBC URNS QL MICRO: ABNORMAL /HPF (ref 0–4)

## 2019-05-11 PROCEDURE — 85025 COMPLETE CBC W/AUTO DIFF WBC: CPT

## 2019-05-11 PROCEDURE — 99283 EMERGENCY DEPT VISIT LOW MDM: CPT

## 2019-05-11 PROCEDURE — 83690 ASSAY OF LIPASE: CPT

## 2019-05-11 PROCEDURE — 36415 COLL VENOUS BLD VENIPUNCTURE: CPT

## 2019-05-11 PROCEDURE — 96361 HYDRATE IV INFUSION ADD-ON: CPT

## 2019-05-11 PROCEDURE — 74011250637 HC RX REV CODE- 250/637: Performed by: EMERGENCY MEDICINE

## 2019-05-11 PROCEDURE — 83880 ASSAY OF NATRIURETIC PEPTIDE: CPT

## 2019-05-11 PROCEDURE — 74011250636 HC RX REV CODE- 250/636: Performed by: EMERGENCY MEDICINE

## 2019-05-11 PROCEDURE — 81001 URINALYSIS AUTO W/SCOPE: CPT

## 2019-05-11 PROCEDURE — 80053 COMPREHEN METABOLIC PANEL: CPT

## 2019-05-11 PROCEDURE — 96374 THER/PROPH/DIAG INJ IV PUSH: CPT

## 2019-05-11 RX ORDER — DICYCLOMINE HYDROCHLORIDE 10 MG/1
20 CAPSULE ORAL
Status: COMPLETED | OUTPATIENT
Start: 2019-05-11 | End: 2019-05-11

## 2019-05-11 RX ORDER — ONDANSETRON 2 MG/ML
4 INJECTION INTRAMUSCULAR; INTRAVENOUS
Status: COMPLETED | OUTPATIENT
Start: 2019-05-11 | End: 2019-05-11

## 2019-05-11 RX ADMIN — ONDANSETRON 4 MG: 2 INJECTION INTRAMUSCULAR; INTRAVENOUS at 12:12

## 2019-05-11 RX ADMIN — SODIUM CHLORIDE 500 ML: 900 INJECTION, SOLUTION INTRAVENOUS at 12:14

## 2019-05-11 RX ADMIN — DICYCLOMINE HYDROCHLORIDE 20 MG: 10 CAPSULE ORAL at 12:16

## 2019-05-11 NOTE — DISCHARGE INSTRUCTIONS

## 2019-05-11 NOTE — ED PROVIDER NOTES
I have evaluated the patient as the Provider in Triage. I have reviewed Her vital signs and the triage nurse assessment. I have talked with the patient and any available family and advised that I am the provider in triage and have ordered the appropriate study to initiate their work up based on the clinical presentation during my assessment. I have advised that the patient will be accommodated in the Main ED as soon as possible. I have also requested to contact the triage nurse or myself immediately if the patient experiences any changes in their condition during this brief waiting period. Pt's relative reports Pt was recently diagnosed with a UTI. Pt was prescribed a 10 day course of Macrobid, which she completed yesterday. Per relative, Pt c/o  diarrhea, abdominal cramping, nausea, and foot swelling for the past couple of days. Note written by Lidia Rinne, Scribe, in Triage as dictated by Martha Day MD 11:37 AM 
 
 
------------------------- 
 
80 y.o. female with past medical history significant for UTI, senile osteopenia, spinal stenosis, asthma, fibromyalgia, arthritis, and hypothyroidism who presents from home via personal vehicle with chief complaint of diarrhea. Pt presents to the ED and reports nausea, diarrhea, intermittent abdominal cramping, bilateral foot edema, difficulty urinating for past couple of days. Pt was recently diagnosed with a UTI and given a 10 day course of Macrobid (PO - 2x/day) which she finished on 5/10/19. Pt denies any other current pain, emesis, decreased PO intake, or fever. There are no other acute medical concerns at this time. Surgical hx - GRAHAM & BSO Social hx - Tobacco use: non-smoker, Alcohol Use: non-drinker PCP: Vel Troy MD 
 
Note written by Kayleen Vasquez, in Room as dictated by Golden Koch MD 11:50 PM. The history is provided by the patient and a relative. No  was used. Past Medical History:  
Diagnosis Date  Arthritis  Asthma   
 allergies  History of fibromyalgia  Senile osteopenia  Spinal stenosis  Thyroid disease   
 hypothyroidism Past Surgical History:  
Procedure Laterality Date  HX HEENT  3/2008  
 cat. ext. O.Andres Kenney HX GRAHAM AND BSO  1984  
 cervical Ca Family History:  
Problem Relation Age of Onset  Heart Disease Mother  Stroke Father Social History Socioeconomic History  Marital status:  Spouse name: Not on file  Number of children: Not on file  Years of education: Not on file  Highest education level: Not on file Occupational History  Not on file Social Needs  Financial resource strain: Not on file  Food insecurity:  
  Worry: Not on file Inability: Not on file  Transportation needs:  
  Medical: Not on file Non-medical: Not on file Tobacco Use  Smoking status: Never Smoker  Smokeless tobacco: Never Used Substance and Sexual Activity  Alcohol use: No  
 Drug use: No  
 Sexual activity: Not Currently Lifestyle  Physical activity:  
  Days per week: Not on file Minutes per session: Not on file  Stress: Not on file Relationships  Social connections:  
  Talks on phone: Not on file Gets together: Not on file Attends Cheondoism service: Not on file Active member of club or organization: Not on file Attends meetings of clubs or organizations: Not on file Relationship status: Not on file  Intimate partner violence:  
  Fear of current or ex partner: Not on file Emotionally abused: Not on file Physically abused: Not on file Forced sexual activity: Not on file Other Topics Concern  Not on file Social History Narrative  Not on file ALLERGIES: Demerol [meperidine]; Doxycycline; and Novocain [procaine] Review of Systems Constitutional: Negative for appetite change and fever. HENT: Negative for ear pain and facial swelling. Eyes: Negative for pain and visual disturbance. Respiratory: Negative for chest tightness. Cardiovascular: Positive for leg swelling (bilateral, feet only). Negative for chest pain. Gastrointestinal: Positive for abdominal pain, diarrhea and nausea. Negative for vomiting. Genitourinary: Positive for difficulty urinating. Negative for flank pain. Musculoskeletal: Negative for back pain. Skin: Negative for rash. Neurological: Negative for dizziness and headaches. Hematological: Negative for adenopathy. Psychiatric/Behavioral: Negative for suicidal ideas. All other systems reviewed and are negative. There were no vitals filed for this visit. Physical Exam  
Constitutional: She appears well-developed and well-nourished. No distress. Pt is hard of hearing HENT:  
Head: Normocephalic and atraumatic. Mouth/Throat: Oropharynx is clear and moist.  
Eyes: Pupils are equal, round, and reactive to light. No scleral icterus. Neck: Normal range of motion. Neck supple. No thyromegaly present. Cardiovascular: Normal rate, regular rhythm, normal heart sounds and intact distal pulses. No murmur heard. Pulmonary/Chest: Effort normal and breath sounds normal. No respiratory distress. Abdominal: Soft. Bowel sounds are normal. She exhibits no distension. There is no tenderness. Musculoskeletal: Normal range of motion. She exhibits no edema. Neurological: She is alert. Skin: Skin is warm and dry. No rash noted. She is not diaphoretic. Nursing note and vitals reviewed. Note written by Kayleen Vasquez, as dictated by Golden Koch MD 11:50 PM 
 
 
MDM Number of Diagnoses or Management Options Diarrhea, unspecified type:  
Diagnosis management comments: A:  Diarrhea for several days since taking macrobid for 2 weeks. VS stable. Exam unremarkable. Labs reassuring. Symptoms likely related to recent abx use. P: 
Discharge home. Procedures

## 2019-08-27 ENCOUNTER — TELEPHONE (OUTPATIENT)
Dept: INTERNAL MEDICINE CLINIC | Age: 84
End: 2019-08-27

## 2019-08-27 NOTE — TELEPHONE ENCOUNTER
Pts daughter, Lucrecia Arriaza, states mom is in and out of ER due to continuous bladder infection. She mentioned pt is senile and most of the time never wants to go to the doctors or hospital for bladder infections which causes it to get worse. Lucrecia Arriaza understands the protocol with Dr. Susanne Gee pts but is asking if Dr. Freda Hughes can please see her as an ER follow-up this week. Please confirm with Dr. Freda Hughes. Thanks. Also, daughter would like to speak with nurse to discuss pts behavior, if possible. Thanks.

## 2019-08-27 NOTE — TELEPHONE ENCOUNTER
Left voicemail message for patient's daughter, Gurwinder Randolph requesting a call back to discuss patient's ER visits & need for an ER follow-up appointment. Patient's daughter is aware that patient's former PCP, Dr. Pieter Hook, is not returning to the practice.

## 2019-08-28 NOTE — TELEPHONE ENCOUNTER
R/C to Damien Etienne and offered to schedule LILIAM with Dr Neymar Alston as per provider. Damien Etienne will call office back to schedule appt.

## 2019-08-30 ENCOUNTER — TELEPHONE (OUTPATIENT)
Dept: INTERNAL MEDICINE CLINIC | Age: 84
End: 2019-08-30

## 2019-08-30 NOTE — TELEPHONE ENCOUNTER
Patient's daughter Penny Del Angel returned call to the practice , scheduled LILIAM visit for 09/03 at  1015am for a 30 min apt.

## 2019-09-03 ENCOUNTER — OFFICE VISIT (OUTPATIENT)
Dept: INTERNAL MEDICINE CLINIC | Age: 84
End: 2019-09-03

## 2019-09-03 VITALS
OXYGEN SATURATION: 96 % | BODY MASS INDEX: 28.34 KG/M2 | SYSTOLIC BLOOD PRESSURE: 130 MMHG | WEIGHT: 126 LBS | HEART RATE: 60 BPM | HEIGHT: 56 IN | DIASTOLIC BLOOD PRESSURE: 68 MMHG | TEMPERATURE: 98.5 F | RESPIRATION RATE: 16 BRPM

## 2019-09-03 DIAGNOSIS — M25.511 RIGHT SHOULDER PAIN, UNSPECIFIED CHRONICITY: ICD-10-CM

## 2019-09-03 DIAGNOSIS — N39.0 RECURRENT UTI: ICD-10-CM

## 2019-09-03 DIAGNOSIS — J18.9 PNEUMONIA DUE TO INFECTIOUS ORGANISM, UNSPECIFIED LATERALITY, UNSPECIFIED PART OF LUNG: ICD-10-CM

## 2019-09-03 DIAGNOSIS — F03.91 DEMENTIA WITH BEHAVIORAL DISTURBANCE, UNSPECIFIED DEMENTIA TYPE: ICD-10-CM

## 2019-09-03 DIAGNOSIS — H91.13 PRESBYCUSIS OF BOTH EARS: ICD-10-CM

## 2019-09-03 DIAGNOSIS — Z09 HOSPITAL DISCHARGE FOLLOW-UP: Primary | ICD-10-CM

## 2019-09-03 RX ORDER — CITALOPRAM 20 MG/1
1 TABLET, FILM COATED ORAL DAILY
COMMUNITY
Start: 2019-09-02

## 2019-09-03 NOTE — PROGRESS NOTES
Zeb Garcia is a 80 y.o. female who presents today for Transitions Of Care  . She has a history of   Patient Active Problem List   Diagnosis Code    Osteoarthritis M19.90    At risk for falls Z91.81    Hearing loss H91.90    Anxiety F41.9    H/O thyroid disease Z86.39    Abnormal MMSE F99    Osteopenia M85.80    Macular degeneration H35.30    DJD (degenerative joint disease), lumbar M47.816    Seasonal affective disorder ([facility] [address] F33.8    Advanced care planning/counseling discussion Z70.80    Presbycusis of both ears H91.13   . Today patient is here for follow-up. .   This is a patient of Dr. aKrrie Shepherd who will be following up with me. Recurrent UTI: Patient has been in the ER several times over the last couple months. Last hospitalization was last week until Saturday. Treated for PNA [place].   D/c home with . Lives alone. She is off all antibiotics. Per daughter she has been seen in the ER several times for UTIs. Given patient's dementia she is very resistant to go to the doctor's office. She only goes when things get very bad. I had a long conversation with her and her daughter about physical therapy and home health and how this can prevent her from needing to go to the hospital.  She does have a daughter who is a hand therapist and she also does reiterate this point. We also discussed hydration status prevent recurrent UTIs. I have voiced my concern over her mental health and potentially her living alone may not be the best idea. Patient is very resistant to any help and does not remain independent. Anemia: noted in May. We do not have any new blood work, but will request [facility] records. Dementia: Patient is taking an SSRI. She does live independently but has 2 daughters that follow-up closely with her. The daughter does note that she does get quite agitated and angry at times. She does have a good bit of sundowning. She sees Dr. Tyrone Sotomayor with neurology.   She is no longer driving or taking care of her finances. Patient notes continued right shoulder pain ever since her fall in June or July. ROS  Review of Systems   Constitutional: Negative for chills, fever and weight loss. HENT: Positive for hearing loss. Negative for congestion and sore throat. Eyes: Negative for blurred vision, double vision and photophobia. Respiratory: Negative for cough and shortness of breath. Cardiovascular: Negative for chest pain, palpitations, orthopnea and leg swelling. Gastrointestinal: Negative for abdominal pain, constipation, diarrhea, heartburn, nausea and vomiting. Genitourinary: Negative for dysuria, frequency and urgency. Musculoskeletal: Positive for joint pain. Negative for myalgias. Skin: Negative for rash. Neurological: Negative. Negative for headaches. Endo/Heme/Allergies: Does not bruise/bleed easily. Psychiatric/Behavioral: Positive for memory loss. Negative for depression, hallucinations, substance abuse and suicidal ideas. The patient is not nervous/anxious and does not have insomnia. Visit Vitals  /68 (BP 1 Location: Left arm, BP Patient Position: Sitting)   Pulse 60   Temp 98.5 °F (36.9 °C) (Oral)   Resp 16   Ht 4' 8\" (1.422 m)   Wt 126 lb (57.2 kg)   SpO2 96%   BMI 28.25 kg/m²       Physical Exam   Constitutional: She is oriented to person, place, and time. She appears well-developed and well-nourished. No distress. HENT:   Head: Normocephalic and atraumatic. Right Ear: External ear normal.   Left Ear: External ear normal.   Neck: Normal range of motion. Neck supple. No thyromegaly present. Cardiovascular: Normal rate and regular rhythm. No murmur heard. Pulmonary/Chest: Effort normal and breath sounds normal. She has no wheezes. Abdominal: Soft. Bowel sounds are normal. She exhibits no distension. Musculoskeletal: She exhibits no edema. Full range of motion of right shoulder.    Neurological: She is alert and oriented to person, place, and time. No cranial nerve deficit. Skin: Skin is warm and dry. Psychiatric:   Patient with obvious memory loss but answers appropriately. Patient makes good eye contact. Patient very hard of hearing which makes it difficult to have a conversation. Patient very irritable at times. This tends to be worse when speaking of her loss of independence. Current Outpatient Medications   Medication Sig    citalopram (CELEXA) 20 mg tablet Take 1 Tab by mouth daily.  polyethylene glycol (MIRALAX) 17 gram/dose powder Take 17 g by mouth daily.  docusate sodium (COLACE) 100 mg capsule Take 1 Cap by mouth two (2) times a day.  FA/VIT C/E/ZINC/COPPER/LUT/TONI (OCUVEL PO) Take  by mouth.  Cholecalciferol, Vitamin D3, (VITAMIN D3) 2,000 unit cap capsule Take 2,000 Units by mouth daily.  cyanocobalamin 1,000 mcg tablet Take 1,000 mcg by mouth as needed.  calcium-cholecalciferol, d3, 600-125 mg-unit tab Take  by mouth as needed.  acetaminophen (TYLENOL) 500 mg tablet Take  by mouth every six (6) hours as needed for Pain.  ibuprofen (MOTRIN) 200 mg tablet Take 400 mg by mouth two (2) times a day. No current facility-administered medications for this visit. Past Medical History:   Diagnosis Date    Arthritis     Asthma     allergies    History of fibromyalgia     Senile osteopenia     Spinal stenosis     Thyroid disease     hypothyroidism      Past Surgical History:   Procedure Laterality Date    HX HEENT  3/2008    cat. ext.  O.DDonnette Heal HX GRAHAM AND BSO  1984    cervical Ca      Social History     Tobacco Use    Smoking status: Never Smoker    Smokeless tobacco: Never Used   Substance Use Topics    Alcohol use: No      Family History   Problem Relation Age of Onset    Heart Disease Mother     Stroke Father         Allergies   Allergen Reactions    Demerol [Meperidine] Other (comments)     GI distress    Doxycycline Hives    Novocain [Procaine] Swelling Assessment/Plan  Diagnoses and all orders for this visit:    1. Hospital discharge follow-up -Per family reported recent pneumonia. Patient was in [facility] will obtain these records. They note that they were told that they need to repeat x-ray and I discussed with them that if this was last week we will repeat this in 4 to 6-week. Patient will schedule to see me for Medicare wellness at that time. 2. Pneumonia due to infectious organism, unspecified laterality, unspecified part of lung -we will get records and repeat imaging in 4 to 6 weeks. 3. Dementia with behavioral disturbance, unspecified dementia type -I am concerned that this is worsening and that she is having more behavioral issues due to her memory loss. Patient is on an SSRI and is followed by neurologist who she is scheduled to see in the coming month. I would like to see her back after this. I voiced my concern over her living independently. She does have 2 daughters who help to take care of her. 4. Right shoulder pain, unspecified chronicity -full range of motion today. No anatomical deformity. Likely soft tissue injury from previous fall. 5. Recurrent UTI -no recent UTI. Patient does have a history of recurrent UTIs likely due to decreased p.o. intake. Encouraged to increase p.o. intake. 6. Presbycusis of both ears -patient very hard of hearing during exam.  We discussed trying to get hearing aids or finding her old hearing aids before follow-up which will help in discussion. Total face-to face time was 40 minutes, greater than 50% of which was spent in counseling and coordination of care. The diagnosis, treatment and various other items were discussed in detail: Test results, medication options, possible side effects, lifestyle changes      Shannon Lim MD  9/3/2019    This note was created with the help of speech recognition software Kendrick Calderon) and may contain some 'sound alike' errors.

## 2019-10-03 ENCOUNTER — TELEPHONE (OUTPATIENT)
Dept: INTERNAL MEDICINE CLINIC | Age: 84
End: 2019-10-03

## 2019-10-03 ENCOUNTER — HOSPITAL ENCOUNTER (OUTPATIENT)
Dept: GENERAL RADIOLOGY | Age: 84
Discharge: HOME | End: 2019-10-03
Attending: INTERNAL MEDICINE
Payer: MEDICARE

## 2019-10-03 ENCOUNTER — OFFICE VISIT (OUTPATIENT)
Dept: INTERNAL MEDICINE CLINIC | Age: 84
End: 2019-10-03

## 2019-10-03 ENCOUNTER — HOSPITAL ENCOUNTER (OUTPATIENT)
Dept: LAB | Age: 84
Discharge: HOME | End: 2019-10-03
Payer: MEDICARE

## 2019-10-03 VITALS
HEART RATE: 62 BPM | OXYGEN SATURATION: 99 % | SYSTOLIC BLOOD PRESSURE: 112 MMHG | HEIGHT: 56 IN | WEIGHT: 126 LBS | DIASTOLIC BLOOD PRESSURE: 70 MMHG | TEMPERATURE: 97.7 F | RESPIRATION RATE: 16 BRPM | BODY MASS INDEX: 28.34 KG/M2

## 2019-10-03 DIAGNOSIS — J18.9 PNEUMONIA DUE TO INFECTIOUS ORGANISM, UNSPECIFIED LATERALITY, UNSPECIFIED PART OF LUNG: ICD-10-CM

## 2019-10-03 DIAGNOSIS — S41.101A OPEN WOUND OF RIGHT UPPER EXTREMITY, INITIAL ENCOUNTER: ICD-10-CM

## 2019-10-03 DIAGNOSIS — R44.3 HALLUCINATIONS: Primary | ICD-10-CM

## 2019-10-03 DIAGNOSIS — N39.0 RECURRENT UTI: ICD-10-CM

## 2019-10-03 DIAGNOSIS — Z86.39 H/O THYROID DISEASE: ICD-10-CM

## 2019-10-03 DIAGNOSIS — F03.91 DEMENTIA WITH BEHAVIORAL DISTURBANCE, UNSPECIFIED DEMENTIA TYPE: ICD-10-CM
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
## 2019-10-03 LAB
BILIRUB UR QL STRIP: NEGATIVE
GLUCOSE UR-MCNC: NEGATIVE MG/DL
KETONES P FAST UR STRIP-MCNC: NORMAL MG/DL
PH UR STRIP: 5.5 [PH] (ref 4.6–8)
PROT UR QL STRIP: NEGATIVE
SP GR UR STRIP: 1.02 (ref 1–1.03)
UA UROBILINOGEN AMB POC: NORMAL (ref 0.2–1)
URINALYSIS CLARITY POC: CLEAR
URINALYSIS COLOR POC: YELLOW
URINE BLOOD POC: NEGATIVE
URINE LEUKOCYTES POC: NEGATIVE
URINE NITRITES POC: NEGATIVE

## 2019-10-03 PROCEDURE — 71046 X-RAY EXAM CHEST 2 VIEWS: CPT

## 2019-10-03 PROCEDURE — 85025 COMPLETE CBC W/AUTO DIFF WBC: CPT

## 2019-10-03 PROCEDURE — 80053 COMPREHEN METABOLIC PANEL: CPT

## 2019-10-03 PROCEDURE — 36415 COLL VENOUS BLD VENIPUNCTURE: CPT

## 2019-10-03 PROCEDURE — 84443 ASSAY THYROID STIM HORMONE: CPT

## 2019-10-03 RX ORDER — QUETIAPINE FUMARATE 25 MG/1
TABLET, FILM COATED ORAL
Refills: 5 | COMMUNITY
Start: 2019-09-27 | End: 2019-11-18

## 2019-10-03 NOTE — PROGRESS NOTES
Lise Osuna is a 80 y.o. female who presents today for Transitions Of Care; Fall; and Altered mental status  . She has a history of   Patient Active Problem List   Diagnosis Code    Osteoarthritis M19.90    At risk for falls Z91.81    Hearing loss H91.90    Anxiety F41.9    H/O thyroid disease Z86.39    Abnormal MMSE F99    Osteopenia M85.80    Macular degeneration H35.30    DJD (degenerative joint disease), lumbar M47.816    Advanced care planning/counseling discussion Z71.89    Presbycusis of both ears H91.13   . Today patient is here for follow-up. .     Dementia: On SSRI. Daughters lives nearby and help her with day-to-day activities. She refused to have home health come see her. Patient has sustained 1 or 2 more falls. Daughter notes that they are frustrated with her refusal at considering reaching out to placement in a nursing home. Patient is no longer driving. Has seen Neurologist last week and placed on Seroquel. This was due to having more hallucinations recently. UA today is normal.   We discussed with daughter that we will check metabolic panel but otherwise to follow-up closely with her neurologist to treat her symptoms. We discussed making sure that she is safe at home. Fall: had a fall today. Has an abrasion to the right elbow    PNA: Patient noted to have a pneumonia when she was hospitalized in late August.  She is due to have this repeated for resolution. ROS  Review of Systems   Constitutional: Negative for chills, fever and weight loss. HENT: Negative for congestion, ear discharge, ear pain, hearing loss, sore throat and tinnitus. Eyes: Negative for blurred vision, double vision and photophobia. Respiratory: Negative for cough and shortness of breath. Cardiovascular: Negative for chest pain, palpitations, orthopnea and leg swelling. Gastrointestinal: Negative for abdominal pain, constipation, diarrhea, heartburn, nausea and vomiting. Genitourinary: Negative for dysuria, frequency and urgency. Musculoskeletal: Positive for falls. Negative for joint pain and myalgias. Skin: Negative for rash. Neurological: Negative. Negative for headaches. Endo/Heme/Allergies: Does not bruise/bleed easily. Psychiatric/Behavioral: Positive for depression, hallucinations and memory loss. Negative for suicidal ideas. The patient is nervous/anxious. Visit Vitals  /70 (BP 1 Location: Left arm, BP Patient Position: Sitting)   Pulse 62   Temp 97.7 °F (36.5 °C) (Oral)   Resp 16   Ht 4' 8\" (1.422 m)   Wt 126 lb (57.2 kg)   SpO2 99%   BMI 28.25 kg/m²       Physical Exam   Constitutional: She appears well-developed and well-nourished. No distress. HENT:   Head: Normocephalic and atraumatic. Right Ear: External ear normal.   Left Ear: External ear normal.   Neck: Normal range of motion. Neck supple. No thyromegaly present. Cardiovascular: Normal rate and regular rhythm. No murmur heard. Pulmonary/Chest: Effort normal and breath sounds normal. She has no wheezes. Abdominal: Soft. Bowel sounds are normal. She exhibits no distension. Musculoskeletal: She exhibits no edema. Neurological: She is alert. No cranial nerve deficit. Oriented to person place and year. Patient answers appropriately makes eye contact. Patient does also seem to confabulate look to his daughter for many answers. Skin: Skin is warm and dry. Open wound to lateral right elbow. Does not appear to be infected is no longer bleeding. Triple antibiotic ointment and Band-Aid was applied today. Psychiatric: She has a normal mood and affect. Her behavior is normal.         Current Outpatient Medications   Medication Sig    QUEtiapine (SEROQUEL) 25 mg tablet TK 4 TS PO D UTD    citalopram (CELEXA) 20 mg tablet Take 1 Tab by mouth daily.  polyethylene glycol (MIRALAX) 17 gram/dose powder Take 17 g by mouth daily.     docusate sodium (COLACE) 100 mg capsule Take 1 Cap by mouth two (2) times a day.  FA/VIT C/E/ZINC/COPPER/LUT/TONI (OCUVEL PO) Take  by mouth.  Cholecalciferol, Vitamin D3, (VITAMIN D3) 2,000 unit cap capsule Take 2,000 Units by mouth daily.  calcium-cholecalciferol, d3, 600-125 mg-unit tab Take  by mouth as needed.  acetaminophen (TYLENOL) 500 mg tablet Take  by mouth every six (6) hours as needed for Pain.  ibuprofen (MOTRIN) 200 mg tablet Take 400 mg by mouth two (2) times a day.  cyanocobalamin 1,000 mcg tablet Take 1,000 mcg by mouth as needed. No current facility-administered medications for this visit. Past Medical History:   Diagnosis Date    Arthritis     Asthma     allergies    History of fibromyalgia     Senile osteopenia     Spinal stenosis     Thyroid disease     hypothyroidism      Past Surgical History:   Procedure Laterality Date    HX HEENT  3/2008    cat. ext. O.DHessie Night HX GRAHAM AND BSO  1984    cervical Ca      Social History     Tobacco Use    Smoking status: Never Smoker    Smokeless tobacco: Never Used   Substance Use Topics    Alcohol use: No      Family History   Problem Relation Age of Onset    Heart Disease Mother     Stroke Father         Allergies   Allergen Reactions    Demerol [Meperidine] Other (comments)     GI distress    Doxycycline Hives    Novocain [Procaine] Swelling    Penicillins Hives        Assessment/Plan  Diagnoses and all orders for this visit:    1. Hallucinations -these seem to be new. She is seeing a neurologist for her dementia and has added recently Seroquel. The reason for the Seroquel has been the hallucinations. We will check a metabolic panel including TSH as she has a history of hypothyroidism. Suggested that she follow closely with neurologist.  -     AMB POC URINALYSIS DIP STICK AUTO W/O MICRO  -     CBC WITH AUTOMATED DIFF  -     METABOLIC PANEL, COMPREHENSIVE  -     TSH 3RD GENERATION    2. Recurrent UTI -UA unremarkable today.   -     AMB POC URINALYSIS DIP STICK AUTO W/O MICRO    3. Pneumonia due to infectious organism, unspecified laterality, unspecified part of lung -x-ray to evaluate for resolution. -     XR CHEST PA LAT; Future    4. Dementia with behavioral disturbance, unspecified dementia type ([facility] [address]    5. H/O thyroid disease  -     TSH 3RD GENERATION    6. Fall, initial encounter -I am concerned that she had sustained several mechanical falls. Given the fact that she is living independently even with assistance of the family it may be in the best interest to look for assisted living facility. Daughter and her sisters have already begun looking into this. 7. Open wound of right upper extremity, initial encounter -wound of right elbow cleaned and dressed today. Follow-up and Dispositions    · Return in about 3 months (around 1/3/2020). Cirilo Babcock MD  10/3/2019    This note was created with the help of speech recognition software Ary Krishnamurthy) and may contain some 'sound alike' errors.

## 2019-10-03 NOTE — TELEPHONE ENCOUNTER
Pt's daughter called and reported Pt fell earlier this AM. Pt is asking to go to ED, Pt's daughter is requesting Pt to be seen in office ASAP. Phone call was disconnected before it could be transferred to nurse. R/C to Pt and daughter and LVM advising to take Pt to ED if needed.

## 2019-10-04 LAB
ALBUMIN SERPL-MCNC: 4.2 G/DL (ref 3.2–4.6)
ALBUMIN/GLOB SERPL: 1.8 {RATIO} (ref 1.2–2.2)
ALP SERPL-CCNC: 60 IU/L (ref 39–117)
ALT SERPL-CCNC: 11 IU/L (ref 0–32)
AST SERPL-CCNC: 22 IU/L (ref 0–40)
BASOPHILS # BLD AUTO: 0 X10E3/UL (ref 0–0.2)
BASOPHILS NFR BLD AUTO: 1 %
BILIRUB SERPL-MCNC: 0.5 MG/DL (ref 0–1.2)
BUN SERPL-MCNC: 25 MG/DL (ref 10–36)
BUN/CREAT SERPL: 33 (ref 12–28)
CALCIUM SERPL-MCNC: 9.3 MG/DL (ref 8.7–10.3)
CHLORIDE SERPL-SCNC: 106 MMOL/L (ref 96–106)
CO2 SERPL-SCNC: 25 MMOL/L (ref 20–29)
CREAT SERPL-MCNC: 0.76 MG/DL (ref 0.57–1)
EOSINOPHIL # BLD AUTO: 0.1 X10E3/UL (ref 0–0.4)
EOSINOPHIL NFR BLD AUTO: 1 %
ERYTHROCYTE [DISTWIDTH] IN BLOOD BY AUTOMATED COUNT: 12.2 % (ref 12.3–15.4)
GLOBULIN SER CALC-MCNC: 2.3 G/DL (ref 1.5–4.5)
GLUCOSE SERPL-MCNC: 96 MG/DL (ref 65–99)
HCT VFR BLD AUTO: 34.3 % (ref 34–46.6)
HGB BLD-MCNC: 11.4 G/DL (ref 11.1–15.9)
IMM GRANULOCYTES # BLD AUTO: 0 X10E3/UL (ref 0–0.1)
IMM GRANULOCYTES NFR BLD AUTO: 0 %
LYMPHOCYTES # BLD AUTO: 0.7 X10E3/UL (ref 0.7–3.1)
LYMPHOCYTES NFR BLD AUTO: 11 %
MCH RBC QN AUTO: 31 PG (ref 26.6–33)
MCHC RBC AUTO-ENTMCNC: 33.2 G/DL (ref 31.5–35.7)
MCV RBC AUTO: 93 FL (ref 79–97)
MONOCYTES # BLD AUTO: 0.5 X10E3/UL (ref 0.1–0.9)
MONOCYTES NFR BLD AUTO: 7 %
NEUTROPHILS # BLD AUTO: 4.8 X10E3/UL (ref 1.4–7)
NEUTROPHILS NFR BLD AUTO: 80 %
PLATELET # BLD AUTO: 220 X10E3/UL (ref 150–450)
POTASSIUM SERPL-SCNC: 4.3 MMOL/L (ref 3.5–5.2)
PROT SERPL-MCNC: 6.5 G/DL (ref 6–8.5)
RBC # BLD AUTO: 3.68 X10E6/UL (ref 3.77–5.28)
SODIUM SERPL-SCNC: 144 MMOL/L (ref 134–144)
TSH SERPL DL<=0.005 MIU/L-ACNC: 2.35 UIU/ML (ref 0.45–4.5)
WBC # BLD AUTO: 6.1 X10E3/UL (ref 3.4–10.8)

## 2019-11-18 ENCOUNTER — OFFICE VISIT (OUTPATIENT)
Dept: INTERNAL MEDICINE CLINIC | Age: 84
End: 2019-11-18

## 2019-11-18 ENCOUNTER — HOSPITAL ENCOUNTER (OUTPATIENT)
Dept: LAB | Age: 84
Discharge: HOME | End: 2019-11-18

## 2019-11-18 ENCOUNTER — TELEPHONE (OUTPATIENT)
Dept: INTERNAL MEDICINE CLINIC | Age: 84
End: 2019-11-18

## 2019-11-18 VITALS
WEIGHT: 129 LBS | HEIGHT: 56 IN | HEART RATE: 62 BPM | OXYGEN SATURATION: 98 % | DIASTOLIC BLOOD PRESSURE: 62 MMHG | BODY MASS INDEX: 29.02 KG/M2 | TEMPERATURE: 97.9 F | RESPIRATION RATE: 16 BRPM | SYSTOLIC BLOOD PRESSURE: 120 MMHG

## 2019-11-18 DIAGNOSIS — N30.90 CYSTITIS: Primary | ICD-10-CM

## 2019-11-18 DIAGNOSIS — N30.90 CYSTITIS: ICD-10-CM

## 2019-11-18 DIAGNOSIS — R41.82 ALTERED MENTAL STATUS, UNSPECIFIED ALTERED MENTAL STATUS TYPE: ICD-10-CM

## 2019-11-18 LAB
APPEARANCE UR: ABNORMAL
BACTERIA URNS QL MICRO: NEGATIVE /HPF
BILIRUB UR QL STRIP: NEGATIVE
BILIRUB UR QL: NEGATIVE
COLOR UR: ABNORMAL
EPITH CASTS URNS QL MICRO: ABNORMAL /LPF
GLUCOSE UR STRIP.AUTO-MCNC: NEGATIVE MG/DL
GLUCOSE UR-MCNC: NEGATIVE MG/DL
HGB UR QL STRIP: NEGATIVE
KETONES P FAST UR STRIP-MCNC: NORMAL MG/DL
KETONES UR QL STRIP.AUTO: ABNORMAL MG/DL
LEUKOCYTE ESTERASE UR QL STRIP.AUTO: ABNORMAL
NITRITE UR QL STRIP.AUTO: NEGATIVE
PH UR STRIP: 5.5 [PH] (ref 4.6–8)
PH UR STRIP: 5.5 [PH] (ref 5–8)
PROT UR QL STRIP: NEGATIVE
PROT UR STRIP-MCNC: NEGATIVE MG/DL
RBC #/AREA URNS HPF: ABNORMAL /HPF (ref 0–5)
SP GR UR REFRACTOMETRY: 1.03 (ref 1–1.03)
SP GR UR STRIP: 1.03 (ref 1–1.03)
UA UROBILINOGEN AMB POC: NORMAL (ref 0.2–1)
URINALYSIS CLARITY POC: CLEAR
URINALYSIS COLOR POC: YELLOW
URINE BLOOD POC: NORMAL
URINE LEUKOCYTES POC: NORMAL
URINE NITRITES POC: NEGATIVE
UROBILINOGEN UR QL STRIP.AUTO: 0.2 EU/DL (ref 0.2–1)
WBC URNS QL MICRO: ABNORMAL /HPF (ref 0–4)

## 2019-11-18 RX ORDER — NITROFURANTOIN 25; 75 MG/1; MG/1
100 CAPSULE ORAL 2 TIMES DAILY
Qty: 10 CAP | Refills: 0 | Status: SHIPPED | OUTPATIENT
Start: 2019-11-18 | End: 2019-11-23

## 2019-11-18 NOTE — PROGRESS NOTES
Eduardo Parker is a 80 y.o. female who presents today for Altered mental status  . She has a history of   Patient Active Problem List   Diagnosis Code    Osteoarthritis M19.90    At risk for falls Z91.81    Hearing loss H91.90    Anxiety F41.9    H/O thyroid disease Z86.39    Abnormal MMSE F99    Osteopenia M85.80    Macular degeneration H35.30    DJD (degenerative joint disease), lumbar M47.816    Advanced care planning/counseling discussion Z71.89    Presbycusis of both ears H91.13   . Today patient is here for an acute visit. Urinary Problems:  Eduardo Parker is a 80 y.o. female who complains of mental status changes x several days, without flank pain, fever, chills, nausea or vomiting. Urine has been cloudy/foul smelling.  her symptoms are moderate. she is drinking plenty of fluids for hydration. her history is significant for: urinary tract infection. .       reports that she does not currently engage in sexual activity. .    No LMP recorded. Patient has had a hysterectomy. Daughter notes that she is been agitated and emotional.  She is been having more hallucinations. ROS  Review of Systems   Constitutional: Negative for chills, fever, malaise/fatigue and weight loss. HENT: Negative for congestion and sore throat. Eyes: Negative for blurred vision, double vision, photophobia, pain and discharge. Respiratory: Negative for cough, hemoptysis, sputum production and shortness of breath. Cardiovascular: Negative for chest pain, palpitations, orthopnea, claudication and leg swelling. Gastrointestinal: Negative for abdominal pain, constipation, diarrhea, heartburn, nausea and vomiting. Genitourinary: Negative for dysuria, frequency and urgency. Musculoskeletal: Negative for back pain, joint pain, myalgias and neck pain. Skin: Negative for rash. Neurological: Negative. Negative for headaches. Endo/Heme/Allergies: Does not bruise/bleed easily. Psychiatric/Behavioral: Positive for hallucinations and memory loss. Negative for depression, substance abuse and suicidal ideas. The patient is not nervous/anxious and does not have insomnia. Visit Vitals  /62 (BP 1 Location: Left arm, BP Patient Position: Sitting)   Pulse 62   Temp 97.9 °F (36.6 °C) (Oral)   Resp 16   Ht 4' 8\" (1.422 m)   Wt 129 lb (58.5 kg)   SpO2 98%   BMI 28.92 kg/m²       Physical Exam   Constitutional: She appears well-developed and well-nourished. HENT:   Head: Normocephalic and atraumatic. Right Ear: External ear normal.   Left Ear: External ear normal.   Eyes: Pupils are equal, round, and reactive to light. EOM are normal.   Neck: Normal range of motion. Neck supple. No thyromegaly present. Cardiovascular: Normal rate and regular rhythm. No murmur heard. Pulmonary/Chest: Effort normal. No respiratory distress. Abdominal: Soft. Bowel sounds are normal. She exhibits no distension and no mass. There is no tenderness. There is no guarding. No CVAT   Neurological: She is alert. Skin: Skin is warm and dry. Psychiatric: She has a normal mood and affect. Her behavior is normal.         Current Outpatient Medications   Medication Sig    citalopram (CELEXA) 20 mg tablet Take 1 Tab by mouth daily.  docusate sodium (COLACE) 100 mg capsule Take 1 Cap by mouth two (2) times a day.  FA/VIT C/E/ZINC/COPPER/LUT/TONI (OCUVEL PO) Take  by mouth.  Cholecalciferol, Vitamin D3, (VITAMIN D3) 2,000 unit cap capsule Take 2,000 Units by mouth daily.  calcium-cholecalciferol, d3, 600-125 mg-unit tab Take  by mouth as needed.  acetaminophen (TYLENOL) 500 mg tablet Take  by mouth every six (6) hours as needed for Pain.  ibuprofen (MOTRIN) 200 mg tablet Take 400 mg by mouth two (2) times a day.  QUEtiapine (SEROQUEL) 25 mg tablet TK 4 TS PO D UTD    polyethylene glycol (MIRALAX) 17 gram/dose powder Take 17 g by mouth daily.     cyanocobalamin 1,000 mcg tablet Take 1,000 mcg by mouth as needed. No current facility-administered medications for this visit. Past Medical History:   Diagnosis Date    Arthritis     Asthma     allergies    History of fibromyalgia     Senile osteopenia     Spinal stenosis     Thyroid disease     hypothyroidism      Past Surgical History:   Procedure Laterality Date    HX HEENT  3/2008    cat. ext. O.DSyliva Umana HX GRAHAM AND BSO  1984    cervical Ca      Social History     Tobacco Use    Smoking status: Never Smoker    Smokeless tobacco: Never Used   Substance Use Topics    Alcohol use: No      Family History   Problem Relation Age of Onset    Heart Disease Mother     Stroke Father         Allergies   Allergen Reactions    Demerol [Meperidine] Other (comments)     GI distress    Doxycycline Hives    Novocain [Procaine] Swelling    Penicillins Hives        Assessment/Plan  Diagnoses and all orders for this visit:    1. Cystitis - UA +. Given mental status changes recently will place on Macrobid twice daily. Will send for culture and stop early if cultures negative. Patient instructed to try to remain hydrated and void every 2-3 hours. -     CULTURE, URINE; Future  -     URINALYSIS W/MICROSCOPIC; Future  -     nitrofurantoin, macrocrystal-monohydrate, (MACROBID) 100 mg capsule; Take 1 Cap by mouth two (2) times a day for 5 days. 2. Altered mental status, unspecified altered mental status type  -     AMB POC URINALYSIS DIP STICK AUTO W/O MICRO            Dalton Riggs MD  11/18/2019    This note was created with the help of speech recognition software Alexandra Lara) and may contain some 'sound alike' errors.

## 2019-11-18 NOTE — PATIENT INSTRUCTIONS
Urinary Tract Infection in Women: Care Instructions Your Care Instructions A urinary tract infection, or UTI, is a general term for an infection anywhere between the kidneys and the urethra (where urine comes out). Most UTIs are bladder infections. They often cause pain or burning when you urinate. UTIs are caused by bacteria and can be cured with antibiotics. Be sure to complete your treatment so that the infection goes away. Follow-up care is a key part of your treatment and safety. Be sure to make and go to all appointments, and call your doctor if you are having problems. It's also a good idea to know your test results and keep a list of the medicines you take. How can you care for yourself at home? · Take your antibiotics as directed. Do not stop taking them just because you feel better. You need to take the full course of antibiotics. · Drink extra water and other fluids for the next day or two. This may help wash out the bacteria that are causing the infection. (If you have kidney, heart, or liver disease and have to limit fluids, talk with your doctor before you increase your fluid intake.) · Avoid drinks that are carbonated or have caffeine. They can irritate the bladder. · Urinate often. Try to empty your bladder each time. · To relieve pain, take a hot bath or lay a heating pad set on low over your lower belly or genital area. Never go to sleep with a heating pad in place. To prevent UTIs · Drink plenty of water each day. This helps you urinate often, which clears bacteria from your system. (If you have kidney, heart, or liver disease and have to limit fluids, talk with your doctor before you increase your fluid intake.) · Urinate when you need to. · Urinate right after you have sex. · Change sanitary pads often. · Avoid douches, bubble baths, feminine hygiene sprays, and other feminine hygiene products that have deodorants. · After going to the bathroom, wipe from front to back. When should you call for help? Call your doctor now or seek immediate medical care if: 
  · Symptoms such as fever, chills, nausea, or vomiting get worse or appear for the first time.  
  · You have new pain in your back just below your rib cage. This is called flank pain.  
  · There is new blood or pus in your urine.  
  · You have any problems with your antibiotic medicine.  
 Watch closely for changes in your health, and be sure to contact your doctor if: 
  · You are not getting better after taking an antibiotic for 2 days.  
  · Your symptoms go away but then come back. Where can you learn more? Go to http://leroy-gabriel.info/. Enter H001 in the search box to learn more about \"Urinary Tract Infection in Women: Care Instructions. \" Current as of: December 19, 2018 Content Version: 12.2 © 3200-0661 [employer]. Care instructions adapted under license by [employer] (which disclaims liability or warranty for this information). If you have questions about a medical condition or this instruction, always ask your healthcare professional. [address] any warranty or liability for your use of this information.

## 2019-11-18 NOTE — TELEPHONE ENCOUNTER
Patients daughter called to report that patient has possible UTI. Daughter is requesting to drop off urine sample as patient is refusing to be seen at practice. PSR advised daughter of [facility] & contact information. Please call daughter to advise.  [phone]

## 2019-11-18 NOTE — TELEPHONE ENCOUNTER
LVM for Pt and daughter advising appt needed, otherwise Dr Emery Le recommends contacting [facility].

## 2019-11-20 ENCOUNTER — TELEPHONE (OUTPATIENT)
Dept: INTERNAL MEDICINE CLINIC | Age: 84
End: 2019-11-20

## 2019-11-20 LAB
BACTERIA SPEC CULT: NORMAL
CC UR VC: NORMAL
SERVICE CMNT-IMP: NORMAL

## 2019-11-20 NOTE — TELEPHONE ENCOUNTER
Patient's daughter called to request results for patients urine culture. Please call to advise. [phone]

## 2019-12-09 ENCOUNTER — TELEPHONE (OUTPATIENT)
Dept: INTERNAL MEDICINE CLINIC | Age: 84
End: 2019-12-09

## 2019-12-09 NOTE — TELEPHONE ENCOUNTER
Patient's daughter is requesting to speak with the nurse to request a letter for the post office, states patient has fallen twice recently trying to get to her mail box, stated she fell last week and busied her self pretty badly.  Requesting a letter that states they need patients mail delivered to her door step , aware the nurse is out of the office today, okay with receiving a call back lex Lambert can be reached at [phone]

## 2020-02-14 ENCOUNTER — TELEPHONE (OUTPATIENT)
Dept: INTERNAL MEDICINE CLINIC | Age: 85
End: 2020-02-14

## 2020-02-14 NOTE — TELEPHONE ENCOUNTER
----- Message from Shen Mckay sent at 2/14/2020  3:28 PM EST -----  Regarding: Dr. Albania Banks first and last name and relationship (if not the patient): Marco Antonio Gan, daughter  Best contact number(s): [phone]  What are the symptoms: Pt fell and busted her head open, was sent to the hospital    Transfer successful - yes/no (include outcome): yes  Transfer declined - yes/no (include reason): no  Was caller advised to seek appropriate level of care - yes/no: Pt was already on the way to the hospital   Details to clarify the request:

## 2020-02-14 NOTE — TELEPHONE ENCOUNTER
Call transferred from answering service. Pt's daughter called and reported Pt has fallen multiple times x last 2 days. Pt is currently at [address] [place] [address] ED for evaluation for head injury resulting from fall earlier today. Pt's daughter is asking if PCP could call [facility] ED and ask for Pt to be admitted for observation as she has been falling at home regularly. Advised Pt's daughter that Pt is currently under ED / hospital care and will be admitted based on their work up. Pt is to f/u in office upon discharge to discuss possible HH to prevent further falls. Pt's daughter verbalized understanding and is agreeable to plan.

## 2020-02-24 ENCOUNTER — OFFICE VISIT (OUTPATIENT)
Dept: INTERNAL MEDICINE CLINIC | Age: 85
End: 2020-02-24

## 2020-02-24 VITALS
BODY MASS INDEX: 29.25 KG/M2 | TEMPERATURE: 97.7 F | HEART RATE: 58 BPM | RESPIRATION RATE: 16 BRPM | SYSTOLIC BLOOD PRESSURE: 138 MMHG | WEIGHT: 130 LBS | OXYGEN SATURATION: 98 % | HEIGHT: 56 IN | DIASTOLIC BLOOD PRESSURE: 72 MMHG
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
## 2020-02-24 DIAGNOSIS — F03.91 DEMENTIA WITH BEHAVIORAL DISTURBANCE, UNSPECIFIED DEMENTIA TYPE: ICD-10-CM

## 2020-02-24 DIAGNOSIS — Z48.02 VISIT FOR SUTURE REMOVAL: Primary | ICD-10-CM

## 2020-02-24 RX ORDER — QUETIAPINE FUMARATE 25 MG/1
TABLET, FILM COATED ORAL
COMMUNITY
Start: 2020-01-24 | End: 2020-06-04

## 2020-02-24 NOTE — PROGRESS NOTES
Miriam Andres is a 80 y.o. female who presents today for Fall  . She has a history of   Patient Active Problem List   Diagnosis Code    Osteoarthritis M19.90    At risk for falls Z91.81    Hearing loss H91.90    Anxiety F41.9    H/O thyroid disease Z86.39    Abnormal MMSE F99    Osteopenia M85.80    Macular degeneration H35.30    DJD (degenerative joint disease), lumbar M47.816    Advanced care planning/counseling discussion Z71.89    Presbycusis of both ears H91.13   . Today patient is here for follow-up. .     Fall: Patient sustained a fall on 14 February. This occurred while she was home alone. She had sutures placed to her R forehead. We will remove these today. Had a long discussion with daughter and patient regarding her current living situation. My concern is that she has had multiple falls and that these will continue to occur. Patient is very resistant to moving out of her house. I discussed with her my concern that this decision will be made for her if she continues to have falls. Dementia: followed by Dr. Della Moise and is currently on Celexa and Seroquel. Will schedule to see Dr. Della Moise again and further evaluate her memory. Hallucinations seem to be better. ROS  Review of Systems   Constitutional: Negative for chills, fever and weight loss. HENT: Negative for congestion and sore throat. Improved bruising to bilateral face below eyes   Eyes: Negative for blurred vision, double vision and photophobia. Respiratory: Negative for cough and shortness of breath. Cardiovascular: Negative for chest pain, palpitations and leg swelling. Gastrointestinal: Negative for abdominal pain, constipation, diarrhea, heartburn, nausea and vomiting. Genitourinary: Negative for dysuria, frequency and urgency. Musculoskeletal: Negative for joint pain and myalgias. Skin: Negative for rash. Neurological: Negative. Negative for headaches.    Endo/Heme/Allergies: Does not bruise/bleed easily. Psychiatric/Behavioral: Positive for hallucinations and memory loss. Negative for depression, substance abuse and suicidal ideas. The patient is not nervous/anxious and does not have insomnia. Visit Vitals  /72 (BP 1 Location: Left arm, BP Patient Position: Sitting)   Pulse (!) 58   Temp 97.7 °F (36.5 °C) (Oral)   Resp 16   Ht 4' 8\" (1.422 m)   Wt 130 lb (59 kg)   SpO2 98%   BMI 29.15 kg/m²       Physical Exam  Constitutional:       General: She is not in acute distress. Appearance: She is well-developed. HENT:      Head: Normocephalic and atraumatic. Comments: Bilateral bruising that is resolving below eyes. 5 sutures to right forehead removed. Right Ear: Tympanic membrane normal.      Left Ear: Tympanic membrane normal.   Neck:      Musculoskeletal: Normal range of motion and neck supple. Thyroid: No thyromegaly. Cardiovascular:      Rate and Rhythm: Normal rate and regular rhythm. Heart sounds: No murmur. Pulmonary:      Effort: Pulmonary effort is normal.      Breath sounds: Normal breath sounds. No wheezing. Abdominal:      General: Bowel sounds are normal. There is no distension. Palpations: Abdomen is soft. Skin:     General: Skin is warm and dry. Neurological:      Mental Status: She is alert and oriented to person, place, and time. Cranial Nerves: No cranial nerve deficit. Psychiatric:         Behavior: Behavior normal.           Current Outpatient Medications   Medication Sig    QUEtiapine (SEROQUEL) 25 mg tablet TK 4 TS PO D UTD    citalopram (CELEXA) 20 mg tablet Take 1 Tab by mouth daily.  docusate sodium (COLACE) 100 mg capsule Take 1 Cap by mouth two (2) times a day.  FA/VIT C/E/ZINC/COPPER/LUT/TONI (OCUVEL PO) Take  by mouth.  acetaminophen (TYLENOL) 500 mg tablet Take  by mouth every six (6) hours as needed for Pain.  ibuprofen (MOTRIN) 200 mg tablet Take 400 mg by mouth two (2) times a day.     polyethylene glycol (MIRALAX) 17 gram/dose powder Take 17 g by mouth daily.  Cholecalciferol, Vitamin D3, (VITAMIN D3) 2,000 unit cap capsule Take 2,000 Units by mouth daily.  calcium-cholecalciferol, d3, 600-125 mg-unit tab Take  by mouth as needed. No current facility-administered medications for this visit. Past Medical History:   Diagnosis Date    Arthritis     Asthma     allergies    History of fibromyalgia     Senile osteopenia     Spinal stenosis     Thyroid disease     hypothyroidism      Past Surgical History:   Procedure Laterality Date    HX HEENT  3/2008    cat. ext. O.DCletis Lipa HX GRAHAM AND BSO  1984    cervical Ca      Social History     Tobacco Use    Smoking status: Never Smoker    Smokeless tobacco: Never Used   Substance Use Topics    Alcohol use: No      Family History   Problem Relation Age of Onset    Heart Disease Mother     Stroke Father         Allergies   Allergen Reactions    Demerol [Meperidine] Other (comments)     GI distress    Doxycycline Hives    Novocain [Procaine] Swelling    Penicillins Hives        Assessment/Plan  Diagnoses and all orders for this visit:    1. Visit for suture removal-removed 5 sutures. Skin well healed. 2. Fall, sequela-suggest doing outpatient physical therapy as she has refused to have any physical therapist come to her house. I am concerned regarding her ongoing dementia and her refusal to move out of her house or get extra help. We discussed further evaluation with Dr. Geoff Hood to see if she is competent to make her own medical decisions. Urged patient to think about other options as I do not see current situation improving. Total face-to face time was 25 minutes, greater than 50% of which was spent in counseling and coordination of care.   The diagnosis, treatment and various other items were discussed in detail: Test results, medication options, possible side effects, lifestyle changes    -     REFERRAL TO PHYSICAL THERAPY    3. Dementia with behavioral disturbance, unspecified dementia type (HCC)-hallucinations overall better. Follow-up and Dispositions    · Return in about 3 months (around 5/24/2020). Lizbeth Taylor MD  2/24/2020    This note was created with the help of speech recognition software Anai Tobar) and may contain some 'sound alike' errors.

## 2020-04-09 ENCOUNTER — TELEPHONE (OUTPATIENT)
Dept: INTERNAL MEDICINE CLINIC | Age: 85
End: 2020-04-09

## 2020-05-04 ENCOUNTER — TELEPHONE (OUTPATIENT)
Dept: INTERNAL MEDICINE CLINIC | Age: 85
End: 2020-05-04

## 2020-05-04 NOTE — TELEPHONE ENCOUNTER
----- Message from Georgina Koehler sent at 5/4/2020 11:25 AM EDT -----  Regarding: Dr. Juan Herrera General Message/Vendor Calls    Caller's first and last name: Merline Coin [facility])       Reason for call: Regarding a request for a petition for a evaluation on the pt .        Call back required yes/no and why: Yes       Best contact number(s): [phone]      Details to clarify the request:      Georgina Koehler

## 2020-05-06 NOTE — TELEPHONE ENCOUNTER
F/u with Ms. Silverman and advise Dr Dale Boyle would be able to complete physical portion of form, advised to contact Dr Alvaro Randolph, neurology for cognitive reasons and Dx.

## 2020-05-11 ENCOUNTER — DOCUMENTATION ONLY (OUTPATIENT)
Dept: INTERNAL MEDICINE CLINIC | Age: 85
End: 2020-05-11

## 2020-06-04 ENCOUNTER — OFFICE VISIT (OUTPATIENT)
Dept: INTERNAL MEDICINE CLINIC | Age: 85
End: 2020-06-04

## 2020-06-04 ENCOUNTER — HOSPITAL ENCOUNTER (OUTPATIENT)
Dept: LAB | Age: 85
Discharge: HOME | End: 2020-06-04

## 2020-06-04 ENCOUNTER — HOSPITAL ENCOUNTER (OUTPATIENT)
Dept: GENERAL RADIOLOGY | Age: 85
Discharge: HOME | End: 2020-06-04
Attending: INTERNAL MEDICINE
Payer: MEDICARE

## 2020-06-04 VITALS
HEART RATE: 56 BPM | OXYGEN SATURATION: 97 % | SYSTOLIC BLOOD PRESSURE: 128 MMHG | RESPIRATION RATE: 16 BRPM | HEIGHT: 56 IN | TEMPERATURE: 98.3 F | WEIGHT: 129 LBS | BODY MASS INDEX: 29.02 KG/M2 | DIASTOLIC BLOOD PRESSURE: 76 MMHG

## 2020-06-04 DIAGNOSIS — Z00.00 MEDICARE ANNUAL WELLNESS VISIT, SUBSEQUENT: ICD-10-CM

## 2020-06-04 DIAGNOSIS — Z23 ENCOUNTER FOR IMMUNIZATION: ICD-10-CM

## 2020-06-04 DIAGNOSIS — Z71.89 ADVANCED DIRECTIVES, COUNSELING/DISCUSSION: ICD-10-CM

## 2020-06-04 DIAGNOSIS — F41.9 ANXIETY: ICD-10-CM

## 2020-06-04 DIAGNOSIS — Z11.1 TUBERCULOSIS SCREENING: ICD-10-CM

## 2020-06-04 DIAGNOSIS — Z00.00 MEDICARE ANNUAL WELLNESS VISIT, SUBSEQUENT: Primary | ICD-10-CM

## 2020-06-04 DIAGNOSIS — M25.511 RIGHT SHOULDER PAIN, UNSPECIFIED CHRONICITY: ICD-10-CM

## 2020-06-04 DIAGNOSIS — Z02.89 ENCOUNTER FOR COMPLETION OF FORM WITH PATIENT: ICD-10-CM

## 2020-06-04 DIAGNOSIS — R41.89 COGNITIVE IMPAIRMENT: ICD-10-CM

## 2020-06-04 LAB
ALBUMIN SERPL-MCNC: 3.6 G/DL (ref 3.5–5)
ALBUMIN/GLOB SERPL: 1.1 {RATIO} (ref 1.1–2.2)
ALP SERPL-CCNC: 64 U/L (ref 45–117)
ALT SERPL-CCNC: 16 U/L (ref 12–78)
ANION GAP SERPL CALC-SCNC: 6 MMOL/L (ref 5–15)
AST SERPL-CCNC: 20 U/L (ref 15–37)
BASOPHILS # BLD: 0 K/UL (ref 0–0.1)
BASOPHILS NFR BLD: 1 % (ref 0–1)
BILIRUB SERPL-MCNC: 0.4 MG/DL (ref 0.2–1)
BUN SERPL-MCNC: 29 MG/DL (ref 6–20)
BUN/CREAT SERPL: 38 (ref 12–20)
CALCIUM SERPL-MCNC: 8.9 MG/DL (ref 8.5–10.1)
CHLORIDE SERPL-SCNC: 108 MMOL/L (ref 97–108)
CO2 SERPL-SCNC: 25 MMOL/L (ref 21–32)
CREAT SERPL-MCNC: 0.77 MG/DL (ref 0.55–1.02)
DIFFERENTIAL METHOD BLD: ABNORMAL
EOSINOPHIL # BLD: 0.1 K/UL (ref 0–0.4)
EOSINOPHIL NFR BLD: 3 % (ref 0–7)
ERYTHROCYTE [DISTWIDTH] IN BLOOD BY AUTOMATED COUNT: 13.3 % (ref 11.5–14.5)
GLOBULIN SER CALC-MCNC: 3.2 G/DL (ref 2–4)
GLUCOSE SERPL-MCNC: 89 MG/DL (ref 65–100)
HCT VFR BLD AUTO: 38.2 % (ref 35–47)
HGB BLD-MCNC: 11.7 G/DL (ref 11.5–16)
IMM GRANULOCYTES # BLD AUTO: 0 K/UL (ref 0–0.04)
IMM GRANULOCYTES NFR BLD AUTO: 0 % (ref 0–0.5)
LYMPHOCYTES # BLD: 0.9 K/UL (ref 0.8–3.5)
LYMPHOCYTES NFR BLD: 19 % (ref 12–49)
MCH RBC QN AUTO: 30.6 PG (ref 26–34)
MCHC RBC AUTO-ENTMCNC: 30.6 G/DL (ref 30–36.5)
MCV RBC AUTO: 100 FL (ref 80–99)
MONOCYTES # BLD: 0.4 K/UL (ref 0–1)
MONOCYTES NFR BLD: 9 % (ref 5–13)
NEUTS SEG # BLD: 3.3 K/UL (ref 1.8–8)
NEUTS SEG NFR BLD: 68 % (ref 32–75)
NRBC # BLD: 0 K/UL (ref 0–0.01)
NRBC BLD-RTO: 0 PER 100 WBC
PLATELET # BLD AUTO: 216 K/UL (ref 150–400)
PMV BLD AUTO: 11.5 FL (ref 8.9–12.9)
POTASSIUM SERPL-SCNC: 4.4 MMOL/L (ref 3.5–5.1)
PROT SERPL-MCNC: 6.8 G/DL (ref 6.4–8.2)
RBC # BLD AUTO: 3.82 M/UL (ref 3.8–5.2)
SODIUM SERPL-SCNC: 139 MMOL/L (ref 136–145)
WBC # BLD AUTO: 4.8 K/UL (ref 3.6–11)

## 2020-06-04 PROCEDURE — 71046 X-RAY EXAM CHEST 2 VIEWS: CPT

## 2020-06-04 RX ORDER — QUETIAPINE FUMARATE 25 MG/1
25 TABLET, FILM COATED ORAL 2 TIMES DAILY
COMMUNITY

## 2020-06-04 RX ORDER — PRAMIPEXOLE DIHYDROCHLORIDE 0.5 MG/1
TABLET ORAL
COMMUNITY
Start: 2020-02-26

## 2020-06-04 NOTE — PROGRESS NOTES
Kip Parra is a 80 y.o. female who presents today for Annual Wellness Visit and Altered mental status  . She has a history of   Patient Active Problem List   Diagnosis Code    Osteoarthritis M19.90    At risk for falls Z91.81    Hearing loss H91.90    Anxiety F41.9    H/O thyroid disease Z86.39    Abnormal MMSE F99    Osteopenia M85.80    Macular degeneration H35.30    DJD (degenerative joint disease), lumbar M47.816    Advanced care planning/counseling discussion Z71.89    Presbycusis of both ears H91.13   . Today patient is here for form completion and Medicare wellness. Dementia/MCI: Patient will be moving into the memory care center at [facility] or may be going to [facility]. Her son Pauline Whiteside is recently obtained power of  for medical decision-making. She still does follow with neurologist Dr. Cornelio Perez. She remains on Celexa as well as Mirapex and Seroquel. Her Seroquel dose has been decreased to once or twice a day due to lethargy family notes that her mental status is overall is unchanged. She is unable to take care of herself or her finances. She is a danger to herself due to recurrent falls. .   Physically she is able to ambulate but due to ongoing back and shoulder issues she has sustained multiple falls recently    Health maintenance hx includes:  Exercise: not active. Form of exercise: little   Diet: generally follows a low fat low cholesterol diet  Social: will be moving into memory care center.      Screening:    Colon cancer screening: N/A   Breast cancer screening: N/A   Cervical cancer screening: N/A   Osteoporosis screening:  Last BMD:  2014 and revealed    - Osteopenia      Immunizations:     Immunization History   Administered Date(s) Administered    Influenza Vaccine Whole 10/01/2010    Pneumococcal Conjugate (PCV-13) 04/12/2016    Pneumococcal Vaccine (Unspecified Type) 04/24/1993    TDAP Vaccine 01/01/2009    Tdap 06/04/2020      Immunization status: We will update tetanus today. ROS  Review of Systems   Constitutional: Negative for chills, fever and weight loss. HENT: Negative for congestion and sore throat. Eyes: Negative for blurred vision, double vision and photophobia. Respiratory: Negative for cough and shortness of breath. Cardiovascular: Negative for chest pain, palpitations and leg swelling. Gastrointestinal: Negative for abdominal pain, constipation, diarrhea, heartburn, nausea and vomiting. Genitourinary: Negative for dysuria, frequency and urgency. Musculoskeletal: Positive for falls and joint pain. Negative for myalgias. Skin: Negative for rash. Neurological: Negative. Negative for headaches. Endo/Heme/Allergies: Does not bruise/bleed easily. Psychiatric/Behavioral: Positive for memory loss. Negative for depression, hallucinations, substance abuse and suicidal ideas. The patient is not nervous/anxious and does not have insomnia. Visit Vitals  /76 (BP 1 Location: Left arm, BP Patient Position: Sitting)   Pulse (!) 56   Temp 98.3 °F (36.8 °C) (Oral)   Resp 16   Ht 4' 8\" (1.422 m)   Wt 129 lb (58.5 kg)   SpO2 97%   BMI 28.92 kg/m²       Physical Exam  Constitutional:       Appearance: She is well-developed. Comments: Answers appropriately. HENT:      Head: Normocephalic and atraumatic. Cardiovascular:      Rate and Rhythm: Normal rate and regular rhythm. Heart sounds: No murmur. Pulmonary:      Effort: Pulmonary effort is normal. No respiratory distress. Musculoskeletal:      Comments: Difficulty with lifting right arm above shoulder. Limited amount of pain with assisted elevation. No swelling of the joint. Good  strength to bilateral upper extremities. Normal sensation to bilateral hands   Skin:     General: Skin is warm and dry. Neurological:      Mental Status: She is alert and oriented to person, place, and time.       Comments: Oriented to self not to year situation or president. Psychiatric:         Behavior: Behavior normal.           Current Outpatient Medications   Medication Sig    pramipexole (MIRAPEX) 0.5 mg tablet TK 2 TS PO D UTD    QUEtiapine (SEROqueL) 25 mg tablet Take 25 mg by mouth two (2) times a day.  citalopram (CELEXA) 20 mg tablet Take 1 Tab by mouth daily.  docusate sodium (COLACE) 100 mg capsule Take 1 Cap by mouth two (2) times a day.  FA/VIT C/E/ZINC/COPPER/LUT/TONI (OCUVEL PO) Take  by mouth.  acetaminophen (TYLENOL) 500 mg tablet Take 1,000 mg by mouth every eight (8) hours as needed for Pain.  ibuprofen (MOTRIN) 200 mg tablet Take 400 mg by mouth two (2) times a day. No current facility-administered medications for this visit. Past Medical History:   Diagnosis Date    Arthritis     Asthma     allergies    History of fibromyalgia     Senile osteopenia     Spinal stenosis     Thyroid disease     hypothyroidism      Past Surgical History:   Procedure Laterality Date    HX HEENT  3/2008    cat. ext. O.DAbran Mail HX GRAHAM AND BSO  1984    cervical Ca      Social History     Tobacco Use    Smoking status: Never Smoker    Smokeless tobacco: Never Used   Substance Use Topics    Alcohol use: No      Family History   Problem Relation Age of Onset    Heart Disease Mother     Stroke Father         Allergies   Allergen Reactions    Demerol [Meperidine] Other (comments)     GI distress    Doxycycline Hives    Novocain [Procaine] Swelling    Penicillins Hives        Assessment/Plan  Diagnoses and all orders for this visit:    1. Medicare annual wellness visit, subsequent  -     CBC WITH AUTOMATED DIFF; Future  -     METABOLIC PANEL, COMPREHENSIVE; Future  -     URINALYSIS W/MICROSCOPIC; Future  -     TETANUS, DIPHTHERIA TOXOIDS AND ACELLULAR PERTUSSIS VACCINE (TDAP), IN INDIVIDS. >=7, IM    2. Anxiety    3. Advanced directives, counseling/discussion    4. Cognitive impairment  -     URINALYSIS W/MICROSCOPIC; Future    5. Tuberculosis screening  -     XR CHEST PORT; Future  -     XR CHEST PA LAT; Future    6. Right shoulder pain, unspecified chronicity    7. Encounter for immunization  -     TETANUS, DIPHTHERIA TOXOIDS AND ACELLULAR PERTUSSIS VACCINE (TDAP), IN INDIVIDS. >=7, GENET Quinones MD  6/4/2020    This note was created with the help of speech recognition software (Dragon) and may contain some 'sound alike' errors. This is the Subsequent Medicare Annual Wellness Exam, performed 12 months or more after the Initial AWV or the last Subsequent AWV    I have reviewed the patient's medical history in detail and updated the computerized patient record. History     Patient Active Problem List   Diagnosis Code    Osteoarthritis M19.90    At risk for falls Z91.81    Hearing loss H91.90    Anxiety F41.9    H/O thyroid disease Z86.39    Abnormal MMSE F99    Osteopenia M85.80    Macular degeneration H35.30    DJD (degenerative joint disease), lumbar M47.816    Advanced care planning/counseling discussion Z71.89    Presbycusis of both ears H91.13     Past Medical History:   Diagnosis Date    Arthritis     Asthma     allergies    History of fibromyalgia     Senile osteopenia     Spinal stenosis     Thyroid disease     hypothyroidism      Past Surgical History:   Procedure Laterality Date    HX HEENT  3/2008    cat. ext. O.DJeffie Michelle HX GRHAAM AND BSO  1984    cervical Ca     Current Outpatient Medications   Medication Sig Dispense Refill    pramipexole (MIRAPEX) 0.5 mg tablet TK 2 TS PO D UTD      QUEtiapine (SEROqueL) 25 mg tablet Take 25 mg by mouth two (2) times a day.  citalopram (CELEXA) 20 mg tablet Take 1 Tab by mouth daily.  docusate sodium (COLACE) 100 mg capsule Take 1 Cap by mouth two (2) times a day. 60 Cap 2    FA/VIT C/E/ZINC/COPPER/LUT/TONI (OCUVEL PO) Take  by mouth.  acetaminophen (TYLENOL) 500 mg tablet Take 1,000 mg by mouth every eight (8) hours as needed for Pain.       ibuprofen (MOTRIN) 200 mg tablet Take 400 mg by mouth two (2) times a day. Allergies   Allergen Reactions    Demerol [Meperidine] Other (comments)     GI distress    Doxycycline Hives    Novocain [Procaine] Swelling    Penicillins Hives       Family History   Problem Relation Age of Onset    Heart Disease Mother     Stroke Father      Social History     Tobacco Use    Smoking status: Never Smoker    Smokeless tobacco: Never Used   Substance Use Topics    Alcohol use: No       Depression Risk Factor Screening:     3 most recent PHQ Screens 6/4/2020   Little interest or pleasure in doing things Not at all   Feeling down, depressed, irritable, or hopeless Not at all   Total Score PHQ 2 0   Trouble falling or staying asleep, or sleeping too much Not at all   Feeling tired or having little energy Not at all   Poor appetite, weight loss, or overeating Not at all   Feeling bad about yourself - or that you are a failure or have let yourself or your family down Not at all   Trouble concentrating on things such as school, work, reading, or watching TV Not at all   Moving or speaking so slowly that other people could have noticed; or the opposite being so fidgety that others notice Not at all   Thoughts of being better off dead, or hurting yourself in some way Not at all   PHQ 9 Score 0   How difficult have these problems made it for you to do your work, take care of your home and get along with others Not difficult at all       Alcohol Risk Factor Screening:   Do you average 1 drink per night or more than 7 drinks a week:  No    On any one occasion in the past three months have you have had more than 3 drinks containing alcohol:  No      Functional Ability and Level of Safety:   Hearing: The patient needs further evaluation. Activities of Daily Living:   The home contains: handrails  Patient needs help with:  phone, transportation, shopping, preparing meals, laundry, housework, managing medications, managing money, dressing, bathing and hygiene    Ambulation: wheelchair bound and Tries to get up and often injures herself    Fall Risk:  Fall Risk Assessment, last 12 mths 6/4/2020   Able to walk? Yes   Fall in past 12 months? No   Fall with injury? -   Number of falls in past 12 months -   Fall Risk Score -       Abuse Screen:  Patient is not abused    Cognitive Screening   Has your family/caregiver stated any concerns about your memory: yes - Documented dementia      Patient Care Team   Patient Care Team:  Eddi Capps MD as PCP - General (Internal Medicine)  Eddi Capps MD as PCP - [facility] Empaneled Provider  Hillary Gaines MD (Neurology)    Assessment/Plan   Education and counseling provided:  Are appropriate based on today's review and evaluation  End-of-Life planning (with patient's consent)  immunization    Diagnoses and all orders for this visit:    1. Medicare annual wellness visit, subsequent- Oswald Gregg was counseled on age-appropriate/ guideline-based risk prevention behaviors and screening for a 80y.o. year old   female . We also discussed adjustments in screening based on family history if necessary. Printed instructions for preventative screening guidelines and healthy behaviors given to patient with after visit summary.    -     CBC WITH AUTOMATED DIFF; Future  -     METABOLIC PANEL, COMPREHENSIVE; Future  -     URINALYSIS W/MICROSCOPIC; Future  -     TETANUS, DIPHTHERIA TOXOIDS AND ACELLULAR PERTUSSIS VACCINE (TDAP), IN INDIVIDS. >=7, IM    2. Anxiety - mood still an issue, some acting out. 3. Advanced directives, counseling/discussion    4. Cognitive impairment - significant dementia. Patient will be admitted into memory unit. Forms filled out.   -     URINALYSIS W/MICROSCOPIC; Future    5. Tuberculosis screening- xray today. -     XR CHEST PORT; Future  -     XR CHEST PA LAT; Future    6. Right shoulder pain, unspecified chronicity    7.  Encounter for immunization  - TETANUS, DIPHTHERIA TOXOIDS AND ACELLULAR PERTUSSIS VACCINE (TDAP), IN INDIVIDS. >=7, IM    8. Form Completion -given patient's recurrent falls I agree with admission into memory unit. We have filled out the forms for them we will get x-ray for tuberculosis screen today. They will let us know if they need any other information from us.     Health Maintenance Due   Topic Date Due    Shingrix Vaccine Age 49> (1 of 2) 04/24/1978    GLAUCOMA SCREENING Q2Y  02/08/2018    DTaP/Tdap/Td series (2 - Td) 01/01/2019

## 2020-06-04 NOTE — PROGRESS NOTES
Eye exams:    Shingles vaccine: Pt has been educated on new shingrix and where to obtain inj. Tdap: Last inj was in 2009.

## 2020-06-04 NOTE — PATIENT INSTRUCTIONS
Medicare Wellness Visit, Female The best way to live healthy is to have a lifestyle where you eat a well-balanced diet, exercise regularly, limit alcohol use, and quit all forms of tobacco/nicotine, if applicable. Regular preventive services are another way to keep healthy. Preventive services (vaccines, screening tests, monitoring & exams) can help personalize your care plan, which helps you manage your own care. Screening tests can find health problems at the earliest stages, when they are easiest to treat. [facility] follows the current, evidence-based guidelines published by the [facility] ([facility]) when recommending preventive services for our patients. Because we follow these guidelines, sometimes recommendations change over time as research supports it. (For example, mammograms used to be recommended annually. Even though Medicare will still pay for an annual mammogram, the newer guidelines recommend a mammogram every two years for women of average risk). Of course, you and your doctor may decide to screen more often for some diseases, based on your risk and your co-morbidities (chronic disease you are already diagnosed with). Preventive services for you include: - Medicare offers their members a free annual wellness visit, which is time for you and your primary care provider to discuss and plan for your preventive service needs. Take advantage of this benefit every year! 
-All adults over the age of 72 should receive the recommended pneumonia vaccines. Current USPSTF guidelines recommend a series of two vaccines for the best pneumonia protection.  
-All adults should have a flu vaccine yearly and a tetanus vaccine every 10 years.  
-All adults age 48 and older should receive the shingles vaccines (series of two vaccines). -All adults age 38-68 who are overweight should have a diabetes screening test once every three years. -All adults born between [birth year] and 1965 should be screened once for Hepatitis C. 
-Other screening tests and preventive services for persons with diabetes include: an eye exam to screen for diabetic retinopathy, a kidney function test, a foot exam, and stricter control over your cholesterol.  
-Cardiovascular screening for adults with routine risk involves an electrocardiogram (ECG) at intervals determined by your doctor.  
-Colorectal cancer screenings should be done for adults age 54-65 with no increased risk factors for colorectal cancer. There are a number of acceptable methods of screening for this type of cancer. Each test has its own benefits and drawbacks. Discuss with your doctor what is most appropriate for you during your annual wellness visit. The different tests include: colonoscopy (considered the best screening method), a fecal occult blood test, a fecal DNA test, and sigmoidoscopy. 
 
-A bone mass density test is recommended when a woman turns 65 to screen for osteoporosis. This test is only recommended one time, as a screening. Some providers will use this same test as a disease monitoring tool if you already have osteoporosis. -Breast cancer screenings are recommended every other year for women of normal risk, age 54-69. 
-Cervical cancer screenings for women over age 72 are only recommended with certain risk factors. Here is a list of your current Health Maintenance items (your personalized list of preventive services) with a due date: 
Health Maintenance Due Topic Date Due  Shingles Vaccine (1 of 2) 04/24/1978  Glaucoma Screening   02/08/2018  DTaP/Tdap/Td  (2 - Td) 01/01/2019 [address] [place] [address] Annual Well Visit  06/16/2019

## 2020-06-04 NOTE — ACP (ADVANCE CARE PLANNING)
Advance Care Planning       Advance Care Planning (ACP) Physician/NP/PA (Provider) Conversation        Date of ACP Conversation: 6/4/2020    Conversation Conducted with:   Patient with Decision Making Capacity   Healthcare Decision Maker: Named in Advance Directive or Healthcare Power of  (name) Her son      Conversation Outcomes / Follow-Up Plan:   Recommended completion of Advance Directive      Length of Voluntary ACP Conversation in minutes:  <16 minutes (Non-Billable)      Celeste Ferguson MD

## 2020-06-09 ENCOUNTER — TELEPHONE (OUTPATIENT)
Dept: INTERNAL MEDICINE CLINIC | Age: 85
End: 2020-06-09

## 2020-06-09 NOTE — TELEPHONE ENCOUNTER
Pt's son called reporting that Pt has \"been acting out\" and \"completely lost her mind\". He is hard to understand over the phone d/t poor connection. Pt has been restless since 3:00 AM today. Pt's son is calling from [facility] ED and states Pt needs to be admitted to [facility] d/t being violent. Mr. Gildardo cohen is requesting support from Dr Modesta Mueller in this matter. Advised Mr. Gildardo cohen that further psych evaluation or admission will be based on [facility] ED provider's evaluation. Nurse asked what he needed help with specifically. He stated: \"thanks for nothing\" and disconnected the call.

## 2020-12-17 ENCOUNTER — APPOINTMENT (OUTPATIENT)
Dept: CT IMAGING | Age: 85
End: 2020-12-17
Attending: EMERGENCY MEDICINE
Payer: MEDICARE

## 2020-12-17 ENCOUNTER — HOSPITAL ENCOUNTER (EMERGENCY)
Age: 85
Discharge: NURSING, REHAB OR LONG-TERM CARE | End: 2020-12-18
Attending: EMERGENCY MEDICINE | Admitting: EMERGENCY MEDICINE
Payer: MEDICARE

## 2020-12-17 ENCOUNTER — HOSPITAL ENCOUNTER (EMERGENCY)
Dept: MRI IMAGING | Age: 85
Discharge: HOME | End: 2020-12-17
Attending: EMERGENCY MEDICINE
Payer: MEDICARE

## 2020-12-17 ENCOUNTER — APPOINTMENT (OUTPATIENT)
Dept: GENERAL RADIOLOGY | Age: 85
End: 2020-12-17
Attending: EMERGENCY MEDICINE
Payer: MEDICARE

## 2020-12-17 DIAGNOSIS — S01.01XA LACERATION OF SCALP, INITIAL ENCOUNTER: ICD-10-CM
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
## 2020-12-17 DIAGNOSIS — R93.7 ABNORMAL MRI, CERVICAL SPINE: ICD-10-CM

## 2020-12-17 LAB
ALBUMIN SERPL-MCNC: 3.2 G/DL (ref 3.5–5)
ALBUMIN/GLOB SERPL: 1.1 {RATIO} (ref 1.1–2.2)
ALP SERPL-CCNC: 82 U/L (ref 45–117)
ALT SERPL-CCNC: 21 U/L (ref 12–78)
ANION GAP SERPL CALC-SCNC: 4 MMOL/L (ref 5–15)
AST SERPL-CCNC: 26 U/L (ref 15–37)
BASOPHILS # BLD: 0 K/UL (ref 0–0.1)
BASOPHILS NFR BLD: 0 % (ref 0–1)
BILIRUB SERPL-MCNC: 0.6 MG/DL (ref 0.2–1)
BUN SERPL-MCNC: 23 MG/DL (ref 6–20)
BUN/CREAT SERPL: 35 (ref 12–20)
CALCIUM SERPL-MCNC: 8.4 MG/DL (ref 8.5–10.1)
CHLORIDE SERPL-SCNC: 108 MMOL/L (ref 97–108)
CO2 SERPL-SCNC: 28 MMOL/L (ref 21–32)
COMMENT, HOLDF: NORMAL
CREAT SERPL-MCNC: 0.65 MG/DL (ref 0.55–1.02)
CRP SERPL-MCNC: 0.75 MG/DL (ref 0–0.6)
DIFFERENTIAL METHOD BLD: ABNORMAL
EOSINOPHIL # BLD: 0.1 K/UL (ref 0–0.4)
EOSINOPHIL NFR BLD: 2 % (ref 0–7)
ERYTHROCYTE [DISTWIDTH] IN BLOOD BY AUTOMATED COUNT: 13.2 % (ref 11.5–14.5)
ERYTHROCYTE [SEDIMENTATION RATE] IN BLOOD: 16 MM/HR (ref 0–30)
GLOBULIN SER CALC-MCNC: 3 G/DL (ref 2–4)
GLUCOSE SERPL-MCNC: 102 MG/DL (ref 65–100)
HCT VFR BLD AUTO: 34.7 % (ref 35–47)
HGB BLD-MCNC: 10.9 G/DL (ref 11.5–16)
IMM GRANULOCYTES # BLD AUTO: 0 K/UL (ref 0–0.04)
IMM GRANULOCYTES NFR BLD AUTO: 0 % (ref 0–0.5)
LYMPHOCYTES # BLD: 0.9 K/UL (ref 0.8–3.5)
LYMPHOCYTES NFR BLD: 13 % (ref 12–49)
MCH RBC QN AUTO: 31.8 PG (ref 26–34)
MCHC RBC AUTO-ENTMCNC: 31.4 G/DL (ref 30–36.5)
MCV RBC AUTO: 101.2 FL (ref 80–99)
MONOCYTES # BLD: 0.5 K/UL (ref 0–1)
MONOCYTES NFR BLD: 7 % (ref 5–13)
NEUTS SEG # BLD: 5.2 K/UL (ref 1.8–8)
NEUTS SEG NFR BLD: 78 % (ref 32–75)
NRBC # BLD: 0 K/UL (ref 0–0.01)
NRBC BLD-RTO: 0 PER 100 WBC
PLATELET # BLD AUTO: 243 K/UL (ref 150–400)
PMV BLD AUTO: 10.1 FL (ref 8.9–12.9)
POTASSIUM SERPL-SCNC: 4.4 MMOL/L (ref 3.5–5.1)
PROT SERPL-MCNC: 6.2 G/DL (ref 6.4–8.2)
RBC # BLD AUTO: 3.43 M/UL (ref 3.8–5.2)
SAMPLES BEING HELD,HOLD: NORMAL
SODIUM SERPL-SCNC: 140 MMOL/L (ref 136–145)
WBC # BLD AUTO: 6.7 K/UL (ref 3.6–11)

## 2020-12-17 PROCEDURE — 80053 COMPREHEN METABOLIC PANEL: CPT

## 2020-12-17 PROCEDURE — 90471 IMMUNIZATION ADMIN: CPT

## 2020-12-17 PROCEDURE — 90715 TDAP VACCINE 7 YRS/> IM: CPT | Performed by: EMERGENCY MEDICINE

## 2020-12-17 PROCEDURE — 99284 EMERGENCY DEPT VISIT MOD MDM: CPT

## 2020-12-17 PROCEDURE — 70450 CT HEAD/BRAIN W/O DYE: CPT

## 2020-12-17 PROCEDURE — 86140 C-REACTIVE PROTEIN: CPT

## 2020-12-17 PROCEDURE — 77030010507 HC ADH SKN DERMBND J&J -B

## 2020-12-17 PROCEDURE — 72125 CT NECK SPINE W/O DYE: CPT

## 2020-12-17 PROCEDURE — 74011250636 HC RX REV CODE- 250/636: Performed by: EMERGENCY MEDICINE

## 2020-12-17 PROCEDURE — 36415 COLL VENOUS BLD VENIPUNCTURE: CPT

## 2020-12-17 PROCEDURE — 72141 MRI NECK SPINE W/O DYE: CPT

## 2020-12-17 PROCEDURE — 73030 X-RAY EXAM OF SHOULDER: CPT

## 2020-12-17 PROCEDURE — 75810000293 HC SIMP/SUPERF WND  RPR

## 2020-12-17 PROCEDURE — 85025 COMPLETE CBC W/AUTO DIFF WBC: CPT

## 2020-12-17 PROCEDURE — 96374 THER/PROPH/DIAG INJ IV PUSH: CPT

## 2020-12-17 PROCEDURE — 85652 RBC SED RATE AUTOMATED: CPT

## 2020-12-17 RX ORDER — LORAZEPAM 2 MG/ML
1 INJECTION INTRAMUSCULAR
Status: COMPLETED | OUTPATIENT
Start: 2020-12-17 | End: 2020-12-17

## 2020-12-17 RX ADMIN — LORAZEPAM 1 MG: 2 INJECTION INTRAMUSCULAR; INTRAVENOUS at 20:26

## 2020-12-17 RX ADMIN — TETANUS TOXOID, REDUCED DIPHTHERIA TOXOID AND ACELLULAR PERTUSSIS VACCINE, ADSORBED 0.5 ML: 5; 2.5; 8; 8; 2.5 SUSPENSION INTRAMUSCULAR at 19:27

## 2020-12-17 NOTE — ED PROVIDER NOTES
80-year-old female with history of arthritis, fibromyalgia, thyroid disease, spinal stenosis presents to the emergency department today by EMS after a fall at home. She was at her facility and the nurses heard her fall. When they arrived she was awake and alert with a laceration to her forehead. There is no report of loss of consciousness. Patient complains of pain to her right shoulder (which may be chronic). EMS reports the patient is at her baseline mental status. The history is provided by the patient, the EMS personnel and medical records. Fall  The accident occurred less than 1 hour ago. The fall occurred while standing. She fell from a height of ground level. She landed on hard floor. The volume of blood lost was minimal. The pain is mild. There was no entrapment after the fall. There was no drug use involved in the accident. There was no alcohol use involved in the accident. Associated symptoms include laceration. Pertinent negatives include no visual change, no fever, no numbness, no abdominal pain, no nausea, no vomiting, no hematuria, no headaches, no extremity weakness, no loss of consciousness and no tingling. It is unknown when the patient last had a tetanus shot. Laceration   The incident occurred less than 1 hour ago. The laceration is located on the scalp. The laceration is 3 cm in size. The injury mechanism is a blunt object. Foreign body present: no. Pertinent negatives include no numbness, no tingling and no weakness. It is unknown when the patient last had a tetanus shot. Past Medical History:   Diagnosis Date    Arthritis     Asthma     allergies    History of fibromyalgia     Senile osteopenia     Spinal stenosis     Thyroid disease     hypothyroidism       Past Surgical History:   Procedure Laterality Date    HX HEENT  3/2008    cat. ext.  O.[facility] HX GRAHAM AND BSO  1984    cervical Ca         Family History:   Problem Relation Age of Onset    Heart Disease Mother    [facility] Stroke Father        Social History     Socioeconomic History    Marital status:      Spouse name: Not on file    Number of children: Not on file    Years of education: Not on file    Highest education level: Not on file   Occupational History    Not on file   Social Needs    Financial resource strain: Not on file    Food insecurity     Worry: Not on file     Inability: Not on file    Transportation needs     Medical: Not on file     Non-medical: Not on file   Tobacco Use    Smoking status: Never Smoker    Smokeless tobacco: Never Used   Substance and Sexual Activity    Alcohol use: No    Drug use: No    Sexual activity: Not Currently   Lifestyle    Physical activity     Days per week: Not on file     Minutes per session: Not on file    Stress: Not on file   Relationships    Social connections     Talks on phone: Not on file     Gets together: Not on file     Attends Hoahaoism service: Not on file     Active member of club or organization: Not on file     Attends meetings of clubs or organizations: Not on file     Relationship status: Not on file    Intimate partner violence     Fear of current or ex partner: Not on file     Emotionally abused: Not on file     Physically abused: Not on file     Forced sexual activity: Not on file   Other Topics Concern    Not on file   Social History Narrative    Not on file         ALLERGIES: Demerol [meperidine], Doxycycline, Novocain [procaine], and Penicillins    Review of Systems   Constitutional: Negative for fatigue and fever. HENT: Negative for sneezing and sore throat. Respiratory: Negative for cough and shortness of breath. Cardiovascular: Negative for chest pain and leg swelling. Gastrointestinal: Negative for abdominal pain, diarrhea, nausea and vomiting. Genitourinary: Negative for difficulty urinating, dysuria and hematuria. Musculoskeletal: Positive for arthralgias. Negative for extremity weakness and myalgias.    Skin: Positive for wound. Negative for color change and rash. Neurological: Negative for tingling, loss of consciousness, weakness, numbness and headaches. Psychiatric/Behavioral: Negative for agitation and behavioral problems. There were no vitals filed for this visit. Physical Exam  Vitals signs and nursing note reviewed. Constitutional:       General: She is not in acute distress. Appearance: Normal appearance. She is normal weight. She is not ill-appearing, toxic-appearing or diaphoretic. Comments: Hard of hearing   HENT:      Head: Normocephalic. Nose: Nose normal.      Mouth/Throat:      Mouth: Mucous membranes are moist.      Pharynx: Oropharynx is clear. Eyes:      Extraocular Movements: Extraocular movements intact. Conjunctiva/sclera: Conjunctivae normal.      Pupils: Pupils are equal, round, and reactive to light. Neck:      Musculoskeletal: Normal range of motion and neck supple. No muscular tenderness. Cardiovascular:      Rate and Rhythm: Normal rate and regular rhythm. Pulses: Normal pulses. Heart sounds: Normal heart sounds. Pulmonary:      Effort: Pulmonary effort is normal. No respiratory distress. Breath sounds: Normal breath sounds. Abdominal:      General: There is no distension. Palpations: Abdomen is soft. Tenderness: There is no abdominal tenderness. There is no guarding or rebound. Musculoskeletal: Normal range of motion. General: Tenderness (Right shoulder) present. No swelling, deformity or signs of injury. Arms:       Right lower leg: No edema. Left lower leg: No edema. Skin:     General: Skin is warm and dry. Capillary Refill: Capillary refill takes less than 2 seconds. Findings: Laceration present. Neurological:      General: No focal deficit present. Mental Status: She is alert and oriented to person, place, and time.    Psychiatric:         Mood and Affect: Mood normal.         Behavior: Behavior normal.          MDM  Number of Diagnoses or Management Options  Diagnosis management comments: 80-year-old female presents as above after a fall. Her laceration was repaired with Dermabond. Her tetanus was updated. She had an abnormal CT scan which was then followed by an MRI which did not show traumatic injury but did show abnormal fluid collections. The patient does not have any new weakness or other focal neurologic findings on exam.  With a negative ESR, only mildly elevated CRP, and no leukocytosis these fluid collections are likely degenerative in nature rather than infected. With some findings of cord impingement she was discussed with neurosurgeon on-call who recommends a collar and follow-up in clinic. Amount and/or Complexity of Data Reviewed  Clinical lab tests: reviewed  Tests in the radiology section of CPT®: reviewed           Wound Closure by Adhesive    Date/Time: 12/17/2020 11:29 PM  Performed by: Shilpa Pagan MD  Authorized by: Shilpa Pagan MD     Consent:     Consent obtained:  Verbal    Consent given by:  Patient    Risks discussed:  Infection    Alternatives discussed:  No treatment  Anesthesia (see MAR for exact dosages):      Anesthesia method:  None  Laceration details:     Location:  Scalp    Scalp location:  Frontal    Length (cm):  3  Repair type:     Repair type:  Simple  Pre-procedure details:     Preparation:  Patient was prepped and draped in usual sterile fashion  Exploration:     Hemostasis achieved with:  Direct pressure    Wound extent: no fascia violation noted, no foreign bodies/material noted, no muscle damage noted, no tendon damage noted, no underlying fracture noted and no vascular damage noted      Contaminated: no    Treatment:     Area cleansed with:  Shur-Clens    Amount of cleaning:  Extensive  Skin repair:     Repair method:  Tissue adhesive  Approximation:     Approximation:  Close  Post-procedure details:     Dressing:  Open (no dressing)                 [id number]: Discussed with Dr. Rahul Nelson of neurosurgery who recommends patient wear a soft c-collar and can follow-up in the clinic.

## 2020-12-17 NOTE — ED TRIAGE NOTES
Pt arrives via EMS from [facility] for GLF and laceration above left eye. No LOC, no blood thinners, bleeding controlled on arrival. Pt GCS of 14 at baseline. No other complaints at this time.

## 2020-12-18 VITALS
WEIGHT: 129 LBS | RESPIRATION RATE: 18 BRPM | DIASTOLIC BLOOD PRESSURE: 47 MMHG | SYSTOLIC BLOOD PRESSURE: 145 MMHG | TEMPERATURE: 98.9 F | HEART RATE: 69 BPM | BODY MASS INDEX: 28.92 KG/M2 | OXYGEN SATURATION: 96 %

## 2020-12-18 NOTE — ED NOTES
Spoke with [employer], pts daughter to complete MRI checklist. States that pt is very claustrophobic and needs heavily medicated before attempting MRI

## 2020-12-18 NOTE — ED NOTES
Report given to [facility] and all essential documentation including PCS and facesheet. Pt hemodynamically stable at time of transfer back to [facility] facility.

## 2020-12-18 NOTE — ED NOTES
Spoke with pt's daughter Nayeli Jean and updated on results and plan for discharge and f/u. Will apply cervical collar and discharge back to facility.

## 2020-12-18 NOTE — ED NOTES
Spoke with Cierra Segundo RN to give report on pt coming back to [facility]. Informed of results and plan for f/u and ETA of transport.

## 2021-09-21 ENCOUNTER — APPOINTMENT (OUTPATIENT)
Dept: GENERAL RADIOLOGY | Age: 86
End: 2021-09-21
Attending: EMERGENCY MEDICINE
Payer: MEDICARE

## 2021-09-21 ENCOUNTER — APPOINTMENT (OUTPATIENT)
Dept: CT IMAGING | Age: 86
End: 2021-09-21
Attending: EMERGENCY MEDICINE
Payer: MEDICARE

## 2021-09-21 ENCOUNTER — HOSPITAL ENCOUNTER (EMERGENCY)
Age: 86
Discharge: HOME | End: 2021-09-21
Attending: EMERGENCY MEDICINE
Payer: MEDICARE

## 2021-09-21 VITALS
HEART RATE: 76 BPM | RESPIRATION RATE: 15 BRPM | DIASTOLIC BLOOD PRESSURE: 60 MMHG | OXYGEN SATURATION: 96 % | SYSTOLIC BLOOD PRESSURE: 126 MMHG

## 2021-09-21 DIAGNOSIS — S01.01XA LACERATION OF SCALP, INITIAL ENCOUNTER: ICD-10-CM
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
## 2021-09-21 PROCEDURE — 70450 CT HEAD/BRAIN W/O DYE: CPT

## 2021-09-21 PROCEDURE — 74011000250 HC RX REV CODE- 250: Performed by: EMERGENCY MEDICINE

## 2021-09-21 PROCEDURE — 99285 EMERGENCY DEPT VISIT HI MDM: CPT

## 2021-09-21 PROCEDURE — 72125 CT NECK SPINE W/O DYE: CPT

## 2021-09-21 PROCEDURE — 75810000293 HC SIMP/SUPERF WND  RPR

## 2021-09-21 PROCEDURE — 73030 X-RAY EXAM OF SHOULDER: CPT

## 2021-09-21 RX ORDER — LIDOCAINE-EPINEPHRINE-TETRACAINE EXTERNAL SOLN 4-0.05-0.5% 4-0.05-0.5 %
2 SOLUTION TOPICAL
Status: COMPLETED | OUTPATIENT
Start: 2021-09-21 | End: 2021-09-21

## 2021-09-21 RX ORDER — BACITRACIN 500 UNIT/G
1 PACKET (EA) TOPICAL
Status: COMPLETED | OUTPATIENT
Start: 2021-09-21 | End: 2021-09-21

## 2021-09-21 RX ADMIN — BACITRACIN 1 PACKET: 500 OINTMENT TOPICAL at 06:53

## 2021-09-21 RX ADMIN — LIDOCAINE-EPINEPHRINE-TETRACAINE EXTERNAL SOLN 4-0.05-0.5% 2 ML: 4-0.05-0.5 SOLUTION at 01:02

## 2021-09-21 NOTE — ED TRIAGE NOTES
Patient arrives via EMS from [facility] for complaints of fall with laceration to the back of her head    EMS reports no blood thinners and patient is at reported baseline

## 2021-09-21 NOTE — ED PROVIDER NOTES
Mario Joyce is a 79 yo F with posterior head laceration after a fall out of her bed. She is a resident at a memory care center. PAtient indicates that she has pain in her right shoulder but no other complaints. She does not take blood thinning medicaitons. Past Medical History:   Diagnosis Date    Arthritis     Asthma     allergies    History of fibromyalgia     Senile osteopenia     Spinal stenosis     Thyroid disease     hypothyroidism       Past Surgical History:   Procedure Laterality Date    HX HEENT  3/2008    cat. ext. [facility] HX GRAHAM AND BSO  1984    cervical Ca         Family History:   Problem Relation Age of Onset    Heart Disease Mother     Stroke Father        Social History     Socioeconomic History    Marital status:      Spouse name: Not on file    Number of children: Not on file    Years of education: Not on file    Highest education level: Not on file   Occupational History    Not on file   Tobacco Use    Smoking status: Never Smoker    Smokeless tobacco: Never Used   Substance and Sexual Activity    Alcohol use: No    Drug use: No    Sexual activity: Not Currently   Other Topics Concern    Not on file   Social History Narrative    Not on file     Social Determinants of Health     Financial Resource Strain:     Difficulty of Paying Living Expenses:    Food Insecurity:     Worried About Running Out of Food in the Last Year:     Ran Out of Food in the Last Year:    Transportation Needs:     Lack of Transportation (Medical):      Lack of Transportation (Non-Medical):    Physical Activity:     Days of Exercise per Week:     Minutes of Exercise per Session:    Stress:     Feeling of Stress :    Social Connections:     Frequency of Communication with Friends and Family:     Frequency of Social Gatherings with Friends and Family:     Attends Spiritism Services:     Active Member of Clubs or Organizations:     Attends Club or Organization Meetings:  Marital Status:    Intimate Partner Violence:     Fear of Current or Ex-Partner:     Emotionally Abused:     Physically Abused:     Sexually Abused: ALLERGIES: Demerol [meperidine], Doxycycline, Novocain [procaine], and Penicillins    Review of Systems   Unable to perform ROS: Dementia   Musculoskeletal:        Right shoulder pain   Skin: Positive for wound. Vitals:    09/21/21 0400 09/21/21 0430 09/21/21 0502 09/21/21 0653   BP:   (!) 112/54 126/60   Pulse:   63 76   Resp:    15   SpO2: 96% 96% 96% 96%            Physical Exam  Vitals and nursing note reviewed. Constitutional:       General: She is not in acute distress. Appearance: She is well-developed. HENT:      Head: Normocephalic and atraumatic. Eyes:      Conjunctiva/sclera: Conjunctivae normal.   Neck:      Trachea: Phonation normal.   Cardiovascular:      Rate and Rhythm: Normal rate. Pulmonary:      Effort: Pulmonary effort is normal. No respiratory distress. Abdominal:      General: There is no distension. Musculoskeletal:         General: No tenderness. Normal range of motion. Cervical back: Normal range of motion. No spinous process tenderness. Skin:     General: Skin is warm and dry. Findings: Laceration (1cm, posterior scalp) present. Neurological:      Mental Status: She is alert. She is not disoriented. Motor: No abnormal muscle tone. MDM       Wound Repair    Date/Time: 9/21/2021 1:35 AM  Performed by: attendingPreparation: skin prepped with Shur-Clens  Pre-procedure re-eval: Immediately prior to the procedure, the patient was reevaluated and found suitable for the planned procedure and any planned medications. Time out: Immediately prior to the procedure a time out was called to verify the correct patient, procedure, equipment, staff and marking as appropriate. .  Location details: scalp  Wound length: 1cm.     Anesthesia:  Local Anesthetic: LET (lido, epi, tetracaine)  Foreign bodies: no foreign bodies  Irrigation solution: saline  Irrigation method: syringe  Skin closure: staples  Number of sutures: 3  Approximation: loose  Dressing: antibiotic ointment  Patient tolerance: patient tolerated the procedure well with no immediate complications  My total time at bedside, performing this procedure was 1-15 minutes. 1:36 PM  CT head and C spine with no acute abnormalities. XR shoulder with slight anterior and superior position of humerus relative to glenoid. PAtient reassessed and I am able to range her shoulder and abduct to about 80 deg without discomfort. Patient states that she has had limited mobility for \"a long time\" and her doctors have advised her that it will not improve. Denies increased pain at this time.

## 2021-09-21 NOTE — ED NOTES
Bedside shift change report given to PAMELA Umana  (oncoming nurse) by Tita Persaud RN (offgoing nurse).  Report included the following information SBAR, Kardex, Intake/Output, MAR, Recent Results    Hourly rounds completed during shift     Awaiting [facility], ETA 0800

## 2022-03-19 PROBLEM — H91.13 PRESBYCUSIS OF BOTH EARS: Status: ACTIVE | Noted: 2018-06-15

## 2023-05-12 RX ORDER — ACETAMINOPHEN 500 MG
TABLET ORAL EVERY 8 HOURS PRN
COMMUNITY

## 2023-05-12 RX ORDER — PSEUDOEPHEDRINE HCL 30 MG
TABLET ORAL 2 TIMES DAILY
COMMUNITY
Start: 2018-06-15

## 2023-05-12 RX ORDER — IBUPROFEN 200 MG
TABLET ORAL 2 TIMES DAILY
COMMUNITY

## 2023-05-12 RX ORDER — QUETIAPINE FUMARATE 25 MG/1
TABLET, FILM COATED ORAL 2 TIMES DAILY
COMMUNITY

## 2023-05-12 RX ORDER — PRAMIPEXOLE DIHYDROCHLORIDE 0.5 MG/1
TABLET ORAL
COMMUNITY
Start: 2020-02-26

## 2023-05-12 RX ORDER — CITALOPRAM 20 MG/1
1 TABLET ORAL DAILY
COMMUNITY
Start: 2019-09-02

## 2024-07-10 ENCOUNTER — APPOINTMENT (OUTPATIENT)
Facility: HOSPITAL | Age: 89
DRG: 871 | End: 2024-07-10
Payer: MEDICARE

## 2024-07-10 ENCOUNTER — HOSPITAL ENCOUNTER (INPATIENT)
Facility: HOSPITAL | Age: 89
LOS: 5 days | Discharge: HOSPICE | DRG: 871 | End: 2024-07-15
Attending: EMERGENCY MEDICINE | Admitting: INTERNAL MEDICINE
Payer: MEDICARE

## 2024-07-10 DIAGNOSIS — A41.9 SEPSIS, DUE TO UNSPECIFIED ORGANISM, UNSPECIFIED WHETHER ACUTE ORGAN DYSFUNCTION PRESENT (HCC): ICD-10-CM

## 2024-07-10 DIAGNOSIS — N39.0 URINARY TRACT INFECTION WITHOUT HEMATURIA, SITE UNSPECIFIED: ICD-10-CM

## 2024-07-10 DIAGNOSIS — B95.61 BACTEREMIA DUE TO STAPHYLOCOCCUS AUREUS: Primary | ICD-10-CM

## 2024-07-10 DIAGNOSIS — R78.81 BACTEREMIA DUE TO STAPHYLOCOCCUS AUREUS: Primary | ICD-10-CM

## 2024-07-10 DIAGNOSIS — R41.82 ALTERED MENTAL STATUS, UNSPECIFIED ALTERED MENTAL STATUS TYPE: ICD-10-CM

## 2024-07-10 LAB
ALBUMIN SERPL-MCNC: 2.5 G/DL (ref 3.5–5)
ALBUMIN/GLOB SERPL: 0.7 (ref 1.1–2.2)
ALP SERPL-CCNC: 73 U/L (ref 45–117)
ALT SERPL-CCNC: 14 U/L (ref 12–78)
AMPHET UR QL SCN: NEGATIVE
ANION GAP SERPL CALC-SCNC: 10 MMOL/L (ref 5–15)
APPEARANCE UR: ABNORMAL
AST SERPL W P-5'-P-CCNC: 19 U/L (ref 15–37)
BACTERIA URNS QL MICRO: ABNORMAL /HPF
BARBITURATES UR QL SCN: NEGATIVE
BASE DEFICIT BLD-SCNC: 10 MMOL/L
BASOPHILS # BLD: 0 K/UL (ref 0–0.1)
BASOPHILS NFR BLD: 0 % (ref 0–1)
BENZODIAZ UR QL: NEGATIVE
BILIRUB SERPL-MCNC: 0.7 MG/DL (ref 0.2–1)
BILIRUB UR QL: NEGATIVE
BUN SERPL-MCNC: 71 MG/DL (ref 6–20)
BUN/CREAT SERPL: 29 (ref 12–20)
CA-I BLD-MCNC: 1.24 MMOL/L (ref 1.12–1.32)
CA-I BLD-MCNC: 8.9 MG/DL (ref 8.5–10.1)
CANNABINOIDS UR QL SCN: NEGATIVE
CHLORIDE BLD-SCNC: 126 MMOL/L (ref 98–107)
CHLORIDE SERPL-SCNC: 125 MMOL/L (ref 97–108)
CO2 BLD-SCNC: 14 MMOL/L
CO2 SERPL-SCNC: 16 MMOL/L (ref 21–32)
COCAINE UR QL SCN: NEGATIVE
COLOR UR: YELLOW
CREAT SERPL-MCNC: 2.43 MG/DL (ref 0.55–1.02)
CREAT UR-MCNC: 2.12 MG/DL (ref 0.6–1.3)
DIFFERENTIAL METHOD BLD: ABNORMAL
EKG ATRIAL RATE: 89 BPM
EKG DIAGNOSIS: NORMAL
EKG Q-T INTERVAL: 372 MS
EKG QRS DURATION: 78 MS
EKG QTC CALCULATION (BAZETT): 452 MS
EKG R AXIS: -2 DEGREES
EKG T AXIS: 86 DEGREES
EKG VENTRICULAR RATE: 89 BPM
EOSINOPHIL # BLD: 0 K/UL (ref 0–0.4)
EOSINOPHIL NFR BLD: 0 % (ref 0–7)
EPITH CASTS URNS QL MICRO: ABNORMAL /LPF
ERYTHROCYTE [DISTWIDTH] IN BLOOD BY AUTOMATED COUNT: 16.2 % (ref 11.5–14.5)
ETHANOL SERPL-MCNC: <10 MG/DL (ref 0–0.08)
FLUAV RNA SPEC QL NAA+PROBE: NOT DETECTED
FLUBV RNA SPEC QL NAA+PROBE: NOT DETECTED
GLOBULIN SER CALC-MCNC: 3.6 G/DL (ref 2–4)
GLUCOSE BLD STRIP.AUTO-MCNC: 125 MG/DL (ref 65–100)
GLUCOSE BLD STRIP.AUTO-MCNC: 131 MG/DL (ref 65–100)
GLUCOSE SERPL-MCNC: 140 MG/DL (ref 65–100)
GLUCOSE UR STRIP.AUTO-MCNC: NEGATIVE MG/DL
HCO3 BLD-SCNC: 14.7 MMOL/L (ref 19–28)
HCT VFR BLD AUTO: 30.9 % (ref 35–47)
HGB BLD-MCNC: 9.4 G/DL (ref 11.5–16)
HGB UR QL STRIP: ABNORMAL
IMM GRANULOCYTES # BLD AUTO: 0 K/UL
IMM GRANULOCYTES NFR BLD AUTO: 0 %
INR PPP: 1.3 (ref 0.9–1.1)
KETONES UR QL STRIP.AUTO: 5 MG/DL
LACTATE BLD-SCNC: 1.68 MMOL/L (ref 0.4–2)
LACTATE SERPL-SCNC: 1.4 MMOL/L (ref 0.4–2)
LEUKOCYTE ESTERASE UR QL STRIP.AUTO: ABNORMAL
LYMPHOCYTES # BLD: 0.5 K/UL (ref 0.8–3.5)
LYMPHOCYTES NFR BLD: 2 % (ref 12–49)
Lab: NORMAL
MAGNESIUM SERPL-MCNC: 2.5 MG/DL (ref 1.6–2.4)
MCH RBC QN AUTO: 30.9 PG (ref 26–34)
MCHC RBC AUTO-ENTMCNC: 30.4 G/DL (ref 30–36.5)
MCV RBC AUTO: 101.6 FL (ref 80–99)
METHADONE UR QL: NEGATIVE
MONOCYTES # BLD: 0.8 K/UL (ref 0–1)
MONOCYTES NFR BLD: 3 % (ref 5–13)
MUCOUS THREADS URNS QL MICRO: ABNORMAL /LPF
NEUTS SEG # BLD: 24.6 K/UL (ref 1.8–8)
NEUTS SEG NFR BLD: 95 % (ref 32–75)
NITRITE UR QL STRIP.AUTO: NEGATIVE
NRBC # BLD: 0 K/UL (ref 0–0.01)
NRBC BLD-RTO: 0 PER 100 WBC
OPIATES UR QL: NEGATIVE
PCO2 BLD: 27.6 MMHG (ref 35–45)
PCP UR QL: NEGATIVE
PERFORMED BY:: ABNORMAL
PERFORMED BY:: ABNORMAL
PH BLD: 7.33 (ref 7.35–7.45)
PH UR STRIP: 5 (ref 5–8)
PLATELET # BLD AUTO: 428 K/UL (ref 150–400)
PMV BLD AUTO: 10.2 FL (ref 8.9–12.9)
PO2 BLD: <27 MMHG (ref 75–100)
POTASSIUM BLD-SCNC: 4.9 MMOL/L (ref 3.5–5.5)
POTASSIUM SERPL-SCNC: 5 MMOL/L (ref 3.5–5.1)
PROCALCITONIN SERPL-MCNC: 4.56 NG/ML
PROT SERPL-MCNC: 6.1 G/DL (ref 6.4–8.2)
PROT UR STRIP-MCNC: 30 MG/DL
PROTHROMBIN TIME: 16.2 SEC (ref 11.9–14.6)
RBC # BLD AUTO: 3.04 M/UL (ref 3.8–5.2)
RBC #/AREA URNS HPF: ABNORMAL /HPF (ref 0–5)
RBC MORPH BLD: ABNORMAL
SARS-COV-2 RNA RESP QL NAA+PROBE: NOT DETECTED
SERVICE CMNT-IMP: ABNORMAL
SODIUM BLD-SCNC: 151 MMOL/L (ref 136–145)
SODIUM SERPL-SCNC: 151 MMOL/L (ref 136–145)
SP GR UR REFRACTOMETRY: 1.02 (ref 1–1.03)
SPECIMEN SITE: ABNORMAL
TROPONIN I SERPL HS-MCNC: 110 NG/L (ref 0–51)
TROPONIN I SERPL HS-MCNC: 128 NG/L (ref 0–51)
TSH SERPL DL<=0.05 MIU/L-ACNC: 3.17 UIU/ML (ref 0.36–3.74)
UROBILINOGEN UR QL STRIP.AUTO: 2 EU/DL (ref 0.1–1)
WBC # BLD AUTO: 25.9 K/UL (ref 3.6–11)
WBC URNS QL MICRO: >100 /HPF (ref 0–4)

## 2024-07-10 PROCEDURE — 6360000002 HC RX W HCPCS: Performed by: INTERNAL MEDICINE

## 2024-07-10 PROCEDURE — 87636 SARSCOV2 & INF A&B AMP PRB: CPT

## 2024-07-10 PROCEDURE — 81001 URINALYSIS AUTO W/SCOPE: CPT

## 2024-07-10 PROCEDURE — 85025 COMPLETE CBC W/AUTO DIFF WBC: CPT

## 2024-07-10 PROCEDURE — 2580000003 HC RX 258: Performed by: INTERNAL MEDICINE

## 2024-07-10 PROCEDURE — 6360000002 HC RX W HCPCS: Performed by: EMERGENCY MEDICINE

## 2024-07-10 PROCEDURE — 96361 HYDRATE IV INFUSION ADD-ON: CPT

## 2024-07-10 PROCEDURE — 87040 BLOOD CULTURE FOR BACTERIA: CPT

## 2024-07-10 PROCEDURE — 94761 N-INVAS EAR/PLS OXIMETRY MLT: CPT

## 2024-07-10 PROCEDURE — 94762 N-INVAS EAR/PLS OXIMTRY CONT: CPT

## 2024-07-10 PROCEDURE — 36415 COLL VENOUS BLD VENIPUNCTURE: CPT

## 2024-07-10 PROCEDURE — 84484 ASSAY OF TROPONIN QUANT: CPT

## 2024-07-10 PROCEDURE — 82330 ASSAY OF CALCIUM: CPT

## 2024-07-10 PROCEDURE — 2700000000 HC OXYGEN THERAPY PER DAY

## 2024-07-10 PROCEDURE — 85610 PROTHROMBIN TIME: CPT

## 2024-07-10 PROCEDURE — 84145 PROCALCITONIN (PCT): CPT

## 2024-07-10 PROCEDURE — 71045 X-RAY EXAM CHEST 1 VIEW: CPT

## 2024-07-10 PROCEDURE — 83735 ASSAY OF MAGNESIUM: CPT

## 2024-07-10 PROCEDURE — 93005 ELECTROCARDIOGRAM TRACING: CPT | Performed by: EMERGENCY MEDICINE

## 2024-07-10 PROCEDURE — 2060000000 HC ICU INTERMEDIATE R&B

## 2024-07-10 PROCEDURE — 2580000003 HC RX 258: Performed by: EMERGENCY MEDICINE

## 2024-07-10 PROCEDURE — 87077 CULTURE AEROBIC IDENTIFY: CPT

## 2024-07-10 PROCEDURE — 84295 ASSAY OF SERUM SODIUM: CPT

## 2024-07-10 PROCEDURE — 99285 EMERGENCY DEPT VISIT HI MDM: CPT

## 2024-07-10 PROCEDURE — 84132 ASSAY OF SERUM POTASSIUM: CPT

## 2024-07-10 PROCEDURE — 80053 COMPREHEN METABOLIC PANEL: CPT

## 2024-07-10 PROCEDURE — 80307 DRUG TEST PRSMV CHEM ANLYZR: CPT

## 2024-07-10 PROCEDURE — 82077 ASSAY SPEC XCP UR&BREATH IA: CPT

## 2024-07-10 PROCEDURE — 83605 ASSAY OF LACTIC ACID: CPT

## 2024-07-10 PROCEDURE — 82803 BLOOD GASES ANY COMBINATION: CPT

## 2024-07-10 PROCEDURE — 84443 ASSAY THYROID STIM HORMONE: CPT

## 2024-07-10 PROCEDURE — 82962 GLUCOSE BLOOD TEST: CPT

## 2024-07-10 PROCEDURE — 70450 CT HEAD/BRAIN W/O DYE: CPT

## 2024-07-10 PROCEDURE — 96374 THER/PROPH/DIAG INJ IV PUSH: CPT

## 2024-07-10 PROCEDURE — 87154 CUL TYP ID BLD PTHGN 6+ TRGT: CPT

## 2024-07-10 PROCEDURE — 87186 SC STD MICRODIL/AGAR DIL: CPT

## 2024-07-10 PROCEDURE — 82947 ASSAY GLUCOSE BLOOD QUANT: CPT

## 2024-07-10 RX ORDER — POLYETHYLENE GLYCOL 3350 17 G/17G
17 POWDER, FOR SOLUTION ORAL DAILY PRN
Status: DISCONTINUED | OUTPATIENT
Start: 2024-07-10 | End: 2024-07-15 | Stop reason: HOSPADM

## 2024-07-10 RX ORDER — ACETAMINOPHEN 650 MG/1
650 SUPPOSITORY RECTAL EVERY 6 HOURS PRN
Status: DISCONTINUED | OUTPATIENT
Start: 2024-07-10 | End: 2024-07-15 | Stop reason: HOSPADM

## 2024-07-10 RX ORDER — 0.9 % SODIUM CHLORIDE 0.9 %
1000 INTRAVENOUS SOLUTION INTRAVENOUS ONCE
Status: COMPLETED | OUTPATIENT
Start: 2024-07-10 | End: 2024-07-10

## 2024-07-10 RX ORDER — SODIUM CHLORIDE 9 MG/ML
INJECTION, SOLUTION INTRAVENOUS PRN
Status: DISCONTINUED | OUTPATIENT
Start: 2024-07-10 | End: 2024-07-15 | Stop reason: HOSPADM

## 2024-07-10 RX ORDER — ONDANSETRON 4 MG/1
4 TABLET, ORALLY DISINTEGRATING ORAL EVERY 8 HOURS PRN
Status: DISCONTINUED | OUTPATIENT
Start: 2024-07-10 | End: 2024-07-15 | Stop reason: HOSPADM

## 2024-07-10 RX ORDER — LIDOCAINE 4 G/G
2 PATCH TOPICAL DAILY
Status: DISCONTINUED | OUTPATIENT
Start: 2024-07-11 | End: 2024-07-15 | Stop reason: HOSPADM

## 2024-07-10 RX ORDER — SODIUM CHLORIDE 0.9 % (FLUSH) 0.9 %
5-40 SYRINGE (ML) INJECTION PRN
Status: DISCONTINUED | OUTPATIENT
Start: 2024-07-10 | End: 2024-07-15 | Stop reason: HOSPADM

## 2024-07-10 RX ORDER — SODIUM CHLORIDE 0.9 % (FLUSH) 0.9 %
5-40 SYRINGE (ML) INJECTION EVERY 12 HOURS SCHEDULED
Status: DISCONTINUED | OUTPATIENT
Start: 2024-07-10 | End: 2024-07-15 | Stop reason: HOSPADM

## 2024-07-10 RX ORDER — SODIUM CHLORIDE 9 MG/ML
INJECTION, SOLUTION INTRAVENOUS CONTINUOUS
Status: DISCONTINUED | OUTPATIENT
Start: 2024-07-10 | End: 2024-07-10

## 2024-07-10 RX ORDER — ONDANSETRON 2 MG/ML
4 INJECTION INTRAMUSCULAR; INTRAVENOUS EVERY 6 HOURS PRN
Status: DISCONTINUED | OUTPATIENT
Start: 2024-07-10 | End: 2024-07-15 | Stop reason: HOSPADM

## 2024-07-10 RX ORDER — MORPHINE SULFATE 2 MG/ML
2 INJECTION, SOLUTION INTRAMUSCULAR; INTRAVENOUS EVERY 4 HOURS PRN
Status: DISCONTINUED | OUTPATIENT
Start: 2024-07-10 | End: 2024-07-11

## 2024-07-10 RX ORDER — HEPARIN SODIUM 5000 [USP'U]/ML
5000 INJECTION, SOLUTION INTRAVENOUS; SUBCUTANEOUS EVERY 12 HOURS
OUTPATIENT
Start: 2024-07-10

## 2024-07-10 RX ORDER — ACETAMINOPHEN 325 MG/1
650 TABLET ORAL EVERY 6 HOURS PRN
Status: DISCONTINUED | OUTPATIENT
Start: 2024-07-10 | End: 2024-07-15 | Stop reason: HOSPADM

## 2024-07-10 RX ADMIN — SODIUM CHLORIDE: 9 INJECTION, SOLUTION INTRAVENOUS at 16:20

## 2024-07-10 RX ADMIN — CEFTRIAXONE SODIUM 1000 MG: 1 INJECTION, POWDER, FOR SOLUTION INTRAMUSCULAR; INTRAVENOUS at 14:44

## 2024-07-10 RX ADMIN — CEFTRIAXONE SODIUM 1000 MG: 1 INJECTION, POWDER, FOR SOLUTION INTRAMUSCULAR; INTRAVENOUS at 12:01

## 2024-07-10 RX ADMIN — SODIUM CHLORIDE 1000 ML: 9 INJECTION, SOLUTION INTRAVENOUS at 13:29

## 2024-07-10 RX ADMIN — SODIUM CHLORIDE, PRESERVATIVE FREE 10 ML: 5 INJECTION INTRAVENOUS at 21:54

## 2024-07-10 ASSESSMENT — LIFESTYLE VARIABLES
HOW OFTEN DO YOU HAVE A DRINK CONTAINING ALCOHOL: NEVER
HOW MANY STANDARD DRINKS CONTAINING ALCOHOL DO YOU HAVE ON A TYPICAL DAY: PATIENT DOES NOT DRINK

## 2024-07-10 NOTE — H&P
No organomegaly.   Extremities: Extremities normal, atraumatic, no cyanosis or edema.   Pulses: 2+ and symmetric all extremities.   Skin: Skin color, texture, turgor normal. No rashes or lesions   Neurologic: Unable to assess due to lethargy though PERRL and no facial droop noted.        24 Hour Results:    Recent Results (from the past 24 hour(s))   POCT Glucose    Collection Time: 07/10/24 10:25 AM   Result Value Ref Range    POC Glucose 131 (H) 65 - 100 mg/dL    Performed by: Lashonda Albert    CMP    Collection Time: 07/10/24 10:31 AM   Result Value Ref Range    Sodium 151 (H) 136 - 145 mmol/L    Potassium 5.0 3.5 - 5.1 mmol/L    Chloride 125 (H) 97 - 108 mmol/L    CO2 16 (L) 21 - 32 mmol/L    Anion Gap 10 5 - 15 mmol/L    Glucose 140 (H) 65 - 100 mg/dL    BUN 71 (H) 6 - 20 mg/dL    Creatinine 2.43 (H) 0.55 - 1.02 mg/dL    BUN/Creatinine Ratio 29 (H) 12 - 20      Est, Glom Filt Rate 18 (L) >60 ml/min/1.73m2    Calcium 8.9 8.5 - 10.1 mg/dL    Total Bilirubin 0.7 0.2 - 1.0 mg/dL    AST 19 15 - 37 U/L    ALT 14 12 - 78 U/L    Alk Phosphatase 73 45 - 117 U/L    Total Protein 6.1 (L) 6.4 - 8.2 g/dL    Albumin 2.5 (L) 3.5 - 5.0 g/dL    Globulin 3.6 2.0 - 4.0 g/dL    Albumin/Globulin Ratio 0.7 (L) 1.1 - 2.2     TSH    Collection Time: 07/10/24 10:31 AM   Result Value Ref Range    TSH, 3rd Generation 3.17 0.36 - 3.74 uIU/mL   ETOH    Collection Time: 07/10/24 10:31 AM   Result Value Ref Range    Ethanol Lvl <10 <10 mg/dL   Magnesium    Collection Time: 07/10/24 10:31 AM   Result Value Ref Range    Magnesium 2.5 (H) 1.6 - 2.4 mg/dL   Troponin    Collection Time: 07/10/24 10:31 AM   Result Value Ref Range    Troponin, High Sensitivity 110 (H) 0 - 51 ng/L   Procalcitonin    Collection Time: 07/10/24 10:31 AM   Result Value Ref Range    Procalcitonin 4.56 (H) 0 ng/mL   CBC with Auto Differential    Collection Time: 07/10/24 10:31 AM   Result Value Ref Range    WBC 25.9 (H) 3.6 - 11.0 K/uL    RBC 3.04 (L) 3.80 -

## 2024-07-10 NOTE — CARE COORDINATION
7/10/24 MENDOZA met with pt ( unresponsive) & daughter ( Shira Turner & ). Daughter requesting pt to be transferred back to [facility] with [facility] - stated services suppose to start tomorrow. Daughter signed Choice Letter for [facility] & [facility] - referrals sent via [facility].  Per [facility] ( [phone])  to see pt today @ facility upon discharge from ED. MENDOZA spoke with DON ( Erica @ [phone]) @ [facility] - informed of above - also stated pt is a Full Code - daughter suppose to f/u with [facility] on changing code status. Daughter ( Shira), Nsg, & MD informed.

## 2024-07-10 NOTE — ED PROVIDER NOTES
[facility] EMERGENCY DEPT  EMERGENCY DEPARTMENT HISTORY AND PHYSICAL EXAM      Date: 7/10/2024  Patient Name: Gena Hughes  MRN: [id number]  Birthdate 4/24/1928  Date of evaluation: 7/10/2024  Provider: Bhargavi Calix MD   Note Started: 1:36 PM EDT 7/10/24    HISTORY OF PRESENT ILLNESS     Chief Complaint   Patient presents with    Altered Mental Status       History Provided By: Patient    HPI: Gena Hughes is a 89 y.o. female patient presents with altered mental status confusion progressive decline poor oral intake over the last 2 weeks.  Unable to provide any history.    PAST MEDICAL HISTORY   Past Medical History:  Past Medical History:   Diagnosis Date    Arthritis     Asthma     allergies    History of fibromyalgia     Senile osteopenia     Spinal stenosis     Thyroid disease     hypothyroidism       Past Surgical History:  Past Surgical History:   Procedure Laterality Date    HEENT  3/2008    cat. ext. O.D.    TOTAL ABDOMINAL HYSTERECTOMY W/ BILATERAL SALPINGOOPHORECTOMY  1984    cervical Ca       Family History:  Family History   Problem Relation Age of Onset    Heart Disease Mother     Stroke Father        Social History:  Social History     Tobacco Use    Smoking status: Never    Smokeless tobacco: Never   Substance Use Topics    Alcohol use: No    Drug use: No       Allergies:  Allergies   Allergen Reactions    Doxycycline Hives    Meperidine Other (See Comments)     GI distress    Penicillins Hives    Procaine Swelling       PCP: Russ Dietz MD    Current Meds:   Current Facility-Administered Medications   Medication Dose Route Frequency Provider Last Rate Last Admin    sodium chloride 0.9 % bolus 1,000 mL  1,000 mL IntraVENous Once Bhargavi Calix MD 1,935.5 mL/hr at 07/10/24 1329 1,000 mL at 07/10/24 1329     Current Outpatient Medications   Medication Sig Dispense Refill    acetaminophen (TYLENOL) 500 MG tablet Take by mouth every 8 hours as needed      citalopram (CELEXA) 20 MG tablet  actual

## 2024-07-10 NOTE — ED NOTES
Pt placed on continuous cardiac monitoring, pulse ox, and blood pressure monitoring at this time.     Family at bedside.

## 2024-07-10 NOTE — ED NOTES
Daughter states the pt is DNR, but there is no paper with her to indicated such, until paper is here and on chart pt is full code

## 2024-07-10 NOTE — CARE COORDINATION
07/10/24 1412   Service Assessment   Patient Orientation Unable to Assess   Cognition Other (see comment)  (Unresponsive)   History Provided By Child/Family  (Daughter Shira Turner.)   Primary Caregiver Other (Comment)  (Facility)   Accompanied By/Relationship Sofia Turner & her .   Support Systems Children;Family Members   Patient's Healthcare Decision Maker is: Legal Next of Kin   PCP Verified by CM Yes  (Seen by facility MD.)   Last Visit to PCP Within last 3 months   Prior Functional Level Assistance with the following:;Bathing;Dressing;Toileting;Feeding;Cooking;Housework;Shopping;Mobility  (W/C)   Current Functional Level Assistance with the following:;Bathing;Dressing;Toileting;Feeding;Cooking;Housework;Shopping;Mobility  (W/C)   Can patient return to prior living arrangement Yes   Ability to make needs known: Unable   Family able to assist with home care needs: Other (comment)  (Facility)   Would you like for me to discuss the discharge plan with any other family members/significant others, and if so, who? Yes  (Daughter Shira Turner)   Financial Resources Medicare   CM/SW Referral Other (see comment)   Social/Functional History   Lives With Other (comment)  (Facility)   Type of Home Facility  (From [facility]/Referral.)   Home Layout One level   Home Access Level entry   Bathroom Shower/Tub Walk-in shower   Bathroom Toilet Handicap height   Bathroom Equipment Grab bars in shower   Bathroom Accessibility Accessible   Home Equipment Wheelchair - Manual   Receives Help From Family;Other (comment)  (Staff)   ADL Assistance Needs assistance   Toileting Needs assistance   Homemaking Assistance Needs assistance   Homemaking Responsibilities Yes   Ambulation Assistance Non-ambulatory  (W/C)   Transfer Assistance Needs assistance   Active  No   Occupation Retired   Discharge Planning   Type of Residence Assisted living  ([facility])   Living Arrangements Other

## 2024-07-10 NOTE — ED NOTES
ED TO INPATIENT SBAR HANDOFF    Patient Name: Gena Hughes   Preferred Name: Gena  : 1928  89 y.o.   Family/Caregiver Present: no   Code Status Order: DNR  PO Status: NPO:Yes  Telemetry Order:   C-SSRS: Risk of Suicide: No Risk  Sitter no   Restraints:     Sepsis Risk Score      Situation  Chief Complaint   Patient presents with    Altered Mental Status     Brief Description of Patient's Condition: Pt presents with general decline, and AMS,   Mental Status: disoriented  Arrived from:Assisted Living  Imaging:   XR CHEST PORTABLE   Final Result      No acute process on portable chest.         Electronically signed by MOUSTAPHA DIAL      CT Head W/O Contrast   Final Result      No acute process. Extensive chronic white matter disease and remote left   occipital infarct      Electronically signed by Loki Montejo        Abnormal labs:   Abnormal Labs Reviewed   COMPREHENSIVE METABOLIC PANEL - Abnormal; Notable for the following components:       Result Value    Sodium 151 (*)     Chloride 125 (*)     CO2 16 (*)     Glucose 140 (*)     BUN 71 (*)     Creatinine 2.43 (*)     BUN/Creatinine Ratio 29 (*)     Est, Glom Filt Rate 18 (*)     Total Protein 6.1 (*)     Albumin 2.5 (*)     Albumin/Globulin Ratio 0.7 (*)     All other components within normal limits   MAGNESIUM - Abnormal; Notable for the following components:    Magnesium 2.5 (*)     All other components within normal limits   URINALYSIS WITH MICROSCOPIC - Abnormal; Notable for the following components:    Appearance Turbid (*)     Protein, UA 30 (*)     Ketones, Urine 5 (*)     Blood, Urine Small (*)     Urobilinogen, Urine 2.0 (*)     Leukocyte Esterase, Urine Small (*)     WBC, UA >100 (*)     Epithelial Cells, UA Moderate (*)     BACTERIA, URINE 4+ (*)     All other components within normal limits   TROPONIN - Abnormal; Notable for the following components:    Troponin, High Sensitivity 110 (*)     All other components within normal limits

## 2024-07-10 NOTE — ED TRIAGE NOTES
Ems was called for a pt that was unresponsive, pt has been on a slow decline from the facility and the pt the facility has no LKW, ems has gotten more from her since they have picked her up pt is not able to verbalize more then just pain, pt is on 4l nc was at 85%, and given 500ml of NS

## 2024-07-11 LAB
ACCESSION NUMBER, LLC1M: ABNORMAL
ACINETOBACTER CALCOAC BAUMANNII COMPLEX BY PCR: NOT DETECTED
ALBUMIN SERPL-MCNC: 2.2 G/DL (ref 3.5–5)
ALBUMIN/GLOB SERPL: 0.6 (ref 1.1–2.2)
ALP SERPL-CCNC: 80 U/L (ref 45–117)
ALT SERPL-CCNC: 12 U/L (ref 12–78)
ANION GAP SERPL CALC-SCNC: 8 MMOL/L (ref 5–15)
AST SERPL W P-5'-P-CCNC: 20 U/L (ref 15–37)
BACTEROIDES FRAGILIS BY PCR: NOT DETECTED
BASOPHILS # BLD: 0 K/UL (ref 0–0.1)
BASOPHILS NFR BLD: 0 % (ref 0–1)
BILIRUB SERPL-MCNC: 0.4 MG/DL (ref 0.2–1)
BIOFIRE TEST COMMENT: ABNORMAL
BUN SERPL-MCNC: 72 MG/DL (ref 6–20)
BUN/CREAT SERPL: 36 (ref 12–20)
CA-I BLD-MCNC: 8.7 MG/DL (ref 8.5–10.1)
CANDIDA ALBICANS BY PCR: NOT DETECTED
CANDIDA AURIS BY PCR: NOT DETECTED
CANDIDA GLABRATA: NOT DETECTED
CANDIDA KRUSEI BY PCR: NOT DETECTED
CANDIDA PARAPSILOSIS BY PCR: NOT DETECTED
CANDIDA TROPICALIS BY PCR: NOT DETECTED
CHLORIDE SERPL-SCNC: 132 MMOL/L (ref 97–108)
CO2 SERPL-SCNC: 12 MMOL/L (ref 21–32)
CREAT SERPL-MCNC: 2 MG/DL (ref 0.55–1.02)
CRYPTOCOCCUS NEOFORMANS/GATTII BY PCR: NOT DETECTED
DIFFERENTIAL METHOD BLD: ABNORMAL
ENTEROBACTER CLOACAE COMPLEX BY PCR: NOT DETECTED
ENTEROBACTERALES BY PCR: NOT DETECTED
ENTEROCOCCUS FAECALIS BY PCR: NOT DETECTED
ENTEROCOCCUS FAECIUM BY PCR: NOT DETECTED
EOSINOPHIL # BLD: 0 K/UL (ref 0–0.4)
EOSINOPHIL NFR BLD: 0 % (ref 0–7)
ERYTHROCYTE [DISTWIDTH] IN BLOOD BY AUTOMATED COUNT: 16.2 % (ref 11.5–14.5)
ESCHERICHIA COLI: NOT DETECTED
GLOBULIN SER CALC-MCNC: 3.5 G/DL (ref 2–4)
GLUCOSE SERPL-MCNC: 66 MG/DL (ref 65–100)
HAEMOPHILUS INFLUENZAE BY PCR: NOT DETECTED
HCT VFR BLD AUTO: 24.5 % (ref 35–47)
HGB BLD-MCNC: 7.4 G/DL (ref 11.5–16)
IMM GRANULOCYTES # BLD AUTO: 0 K/UL
IMM GRANULOCYTES NFR BLD AUTO: 0 %
KLEBSIELLA AEROGENES BY PCR: NOT DETECTED
KLEBSIELLA OXYTOCA BY PCR: NOT DETECTED
KLEBSIELLA PNEUMONIAE GROUP BY PCR: NOT DETECTED
LISTERIA MONOCYTOGENES BY PCR: NOT DETECTED
LYMPHOCYTES # BLD: 0.6 K/UL (ref 0.8–3.5)
LYMPHOCYTES NFR BLD: 3 % (ref 12–49)
MAGNESIUM SERPL-MCNC: 2.4 MG/DL (ref 1.6–2.4)
MCH RBC QN AUTO: 31 PG (ref 26–34)
MCHC RBC AUTO-ENTMCNC: 30.2 G/DL (ref 30–36.5)
MCV RBC AUTO: 102.5 FL (ref 80–99)
MECA+MECC+MREJ ISLT/SPM QL: DETECTED
MONOCYTES # BLD: 0 K/UL (ref 0–1)
MONOCYTES NFR BLD: 0 % (ref 5–13)
MRSA DNA SPEC QL NAA+PROBE: DETECTED
NEISSERIA MENINGITIDIS BY PCR: NOT DETECTED
NEUTS SEG # BLD: 19.4 K/UL (ref 1.8–8)
NEUTS SEG NFR BLD: 97 % (ref 32–75)
NRBC # BLD: 0 K/UL (ref 0–0.01)
NRBC BLD-RTO: 0 PER 100 WBC
PHOSPHATE SERPL-MCNC: 2.8 MG/DL (ref 2.6–4.7)
PLATELET # BLD AUTO: 361 K/UL (ref 150–400)
PMV BLD AUTO: 10.1 FL (ref 8.9–12.9)
POTASSIUM SERPL-SCNC: 4.2 MMOL/L (ref 3.5–5.1)
PROT SERPL-MCNC: 5.7 G/DL (ref 6.4–8.2)
PROTEUS BY PCR: NOT DETECTED
PSEUDOMONAS AERUGINOSA, PSAEP: NOT DETECTED
RBC # BLD AUTO: 2.39 M/UL (ref 3.8–5.2)
RBC MORPH BLD: ABNORMAL
RESISTANT GENE TARGETS: ABNORMAL
SALMONELLA SPECIES BY PCR: NOT DETECTED
SERRATIA MARCESCENS BY PCR: NOT DETECTED
SODIUM SERPL-SCNC: 152 MMOL/L (ref 136–145)
STAPHYLOCOCCUS AUREUS: DETECTED
STAPHYLOCOCCUS EPIDERMIDIS BY PCR: NOT DETECTED
STAPHYLOCOCCUS LUGDUNENSIS BY PCR: NOT DETECTED
STAPHYLOCOCCUS: DETECTED
STENOTROPHOMONAS MALTOPHILIA BY PCR: NOT DETECTED
STREPTOCOCCUS AGALACTIAE (GROUP B): NOT DETECTED
STREPTOCOCCUS PNEUMONIAE , SPNP: NOT DETECTED
STREPTOCOCCUS PYOGENES (GROUP A), SPYOP: NOT DETECTED
STREPTOCOCCUS: NOT DETECTED
TROPONIN I SERPL HS-MCNC: 217 NG/L (ref 0–51)
WBC # BLD AUTO: 20 K/UL (ref 3.6–11)

## 2024-07-11 PROCEDURE — 6360000002 HC RX W HCPCS: Performed by: HOSPITALIST

## 2024-07-11 PROCEDURE — 87186 SC STD MICRODIL/AGAR DIL: CPT

## 2024-07-11 PROCEDURE — 87641 MR-STAPH DNA AMP PROBE: CPT

## 2024-07-11 PROCEDURE — 6370000000 HC RX 637 (ALT 250 FOR IP): Performed by: INTERNAL MEDICINE

## 2024-07-11 PROCEDURE — 87205 SMEAR GRAM STAIN: CPT

## 2024-07-11 PROCEDURE — 2500000003 HC RX 250 WO HCPCS: Performed by: HOSPITALIST

## 2024-07-11 PROCEDURE — 84484 ASSAY OF TROPONIN QUANT: CPT

## 2024-07-11 PROCEDURE — 2060000000 HC ICU INTERMEDIATE R&B

## 2024-07-11 PROCEDURE — 87147 CULTURE TYPE IMMUNOLOGIC: CPT

## 2024-07-11 PROCEDURE — 2580000003 HC RX 258: Performed by: INTERNAL MEDICINE

## 2024-07-11 PROCEDURE — 94761 N-INVAS EAR/PLS OXIMETRY MLT: CPT

## 2024-07-11 PROCEDURE — 83735 ASSAY OF MAGNESIUM: CPT

## 2024-07-11 PROCEDURE — 99223 1ST HOSP IP/OBS HIGH 75: CPT | Performed by: INTERNAL MEDICINE

## 2024-07-11 PROCEDURE — 6360000002 HC RX W HCPCS: Performed by: INTERNAL MEDICINE

## 2024-07-11 PROCEDURE — 36415 COLL VENOUS BLD VENIPUNCTURE: CPT

## 2024-07-11 PROCEDURE — 87040 BLOOD CULTURE FOR BACTERIA: CPT

## 2024-07-11 PROCEDURE — 84100 ASSAY OF PHOSPHORUS: CPT

## 2024-07-11 PROCEDURE — 2580000003 HC RX 258: Performed by: HOSPITALIST

## 2024-07-11 PROCEDURE — 80053 COMPREHEN METABOLIC PANEL: CPT

## 2024-07-11 PROCEDURE — 87070 CULTURE OTHR SPECIMN AEROBIC: CPT

## 2024-07-11 PROCEDURE — 87077 CULTURE AEROBIC IDENTIFY: CPT

## 2024-07-11 PROCEDURE — 85025 COMPLETE CBC W/AUTO DIFF WBC: CPT

## 2024-07-11 RX ORDER — CITALOPRAM 20 MG/1
20 TABLET ORAL DAILY
Status: DISCONTINUED | OUTPATIENT
Start: 2024-07-11 | End: 2024-07-15 | Stop reason: HOSPADM

## 2024-07-11 RX ORDER — MORPHINE SULFATE 2 MG/ML
0.5 INJECTION, SOLUTION INTRAMUSCULAR; INTRAVENOUS EVERY 4 HOURS PRN
Status: DISPENSED | OUTPATIENT
Start: 2024-07-11 | End: 2024-07-12

## 2024-07-11 RX ORDER — TRAMADOL HYDROCHLORIDE 50 MG/1
50 TABLET ORAL EVERY 6 HOURS PRN
Status: ACTIVE | OUTPATIENT
Start: 2024-07-11 | End: 2024-07-14

## 2024-07-11 RX ORDER — DOCUSATE SODIUM 100 MG/1
100 CAPSULE, LIQUID FILLED ORAL 2 TIMES DAILY PRN
Status: DISCONTINUED | OUTPATIENT
Start: 2024-07-11 | End: 2024-07-15 | Stop reason: HOSPADM

## 2024-07-11 RX ORDER — ACETAMINOPHEN 500 MG
1000 TABLET ORAL 2 TIMES DAILY
Status: DISPENSED | OUTPATIENT
Start: 2024-07-11 | End: 2024-07-14

## 2024-07-11 RX ADMIN — MORPHINE SULFATE 0.5 MG: 2 INJECTION, SOLUTION INTRAMUSCULAR; INTRAVENOUS at 11:26

## 2024-07-11 RX ADMIN — SODIUM CHLORIDE, PRESERVATIVE FREE 10 ML: 5 INJECTION INTRAVENOUS at 22:20

## 2024-07-11 RX ADMIN — MUPIROCIN: 20 OINTMENT TOPICAL at 20:24

## 2024-07-11 RX ADMIN — SODIUM BICARBONATE: 84 INJECTION, SOLUTION INTRAVENOUS at 14:26

## 2024-07-11 RX ADMIN — CEFTRIAXONE SODIUM 2000 MG: 2 INJECTION, POWDER, FOR SOLUTION INTRAMUSCULAR; INTRAVENOUS at 15:20

## 2024-07-11 RX ADMIN — MORPHINE SULFATE 2 MG: 2 INJECTION, SOLUTION INTRAMUSCULAR; INTRAVENOUS at 04:49

## 2024-07-11 RX ADMIN — MORPHINE SULFATE 2 MG: 2 INJECTION, SOLUTION INTRAMUSCULAR; INTRAVENOUS at 00:26

## 2024-07-11 RX ADMIN — MORPHINE SULFATE 0.5 MG: 2 INJECTION, SOLUTION INTRAMUSCULAR; INTRAVENOUS at 16:42

## 2024-07-11 RX ADMIN — MORPHINE SULFATE 0.5 MG: 2 INJECTION, SOLUTION INTRAMUSCULAR; INTRAVENOUS at 22:29

## 2024-07-11 RX ADMIN — VANCOMYCIN HYDROCHLORIDE 1500 MG: 750 INJECTION, POWDER, LYOPHILIZED, FOR SOLUTION INTRAVENOUS at 11:36

## 2024-07-11 ASSESSMENT — PAIN DESCRIPTION - ORIENTATION: ORIENTATION: OTHER (COMMENT)

## 2024-07-11 ASSESSMENT — PAIN SCALES - WONG BAKER
WONGBAKER_NUMERICALRESPONSE: NO HURT

## 2024-07-11 ASSESSMENT — PAIN DESCRIPTION - LOCATION
LOCATION: GENERALIZED
LOCATION: GENERALIZED

## 2024-07-11 ASSESSMENT — PAIN DESCRIPTION - DESCRIPTORS
DESCRIPTORS: DISCOMFORT
DESCRIPTORS: ACHING

## 2024-07-11 ASSESSMENT — PAIN SCALES - GENERAL
PAINLEVEL_OUTOF10: 6
PAINLEVEL_OUTOF10: 6

## 2024-07-12 ENCOUNTER — APPOINTMENT (OUTPATIENT)
Facility: HOSPITAL | Age: 89
DRG: 871 | End: 2024-07-12
Attending: INTERNAL MEDICINE
Payer: MEDICARE

## 2024-07-12 ENCOUNTER — APPOINTMENT (OUTPATIENT)
Facility: HOSPITAL | Age: 89
DRG: 871 | End: 2024-07-12
Payer: MEDICARE

## 2024-07-12 LAB
ALBUMIN SERPL-MCNC: 2.2 G/DL (ref 3.5–5)
ANION GAP SERPL CALC-SCNC: 9 MMOL/L (ref 5–15)
ANION GAP SERPL CALC-SCNC: 9 MMOL/L (ref 5–15)
BASOPHILS # BLD: 0 K/UL (ref 0–0.1)
BASOPHILS NFR BLD: 0 % (ref 0–1)
BNP SERPL-MCNC: 3709 PG/ML
BUN SERPL-MCNC: 54 MG/DL (ref 6–20)
BUN SERPL-MCNC: 58 MG/DL (ref 6–20)
BUN/CREAT SERPL: 41 (ref 12–20)
BUN/CREAT SERPL: 42 (ref 12–20)
CA-I BLD-MCNC: 8.8 MG/DL (ref 8.5–10.1)
CA-I BLD-MCNC: 9 MG/DL (ref 8.5–10.1)
CHLORIDE SERPL-SCNC: 127 MMOL/L (ref 97–108)
CHLORIDE SERPL-SCNC: 130 MMOL/L (ref 97–108)
CO2 SERPL-SCNC: 17 MMOL/L (ref 21–32)
CO2 SERPL-SCNC: 19 MMOL/L (ref 21–32)
CREAT SERPL-MCNC: 1.3 MG/DL (ref 0.55–1.02)
CREAT SERPL-MCNC: 1.4 MG/DL (ref 0.55–1.02)
CRP SERPL-MCNC: 28.2 MG/DL (ref 0–0.3)
DATE LAST DOSE: NORMAL
DIFFERENTIAL METHOD BLD: ABNORMAL
DOSE AMOUNT: NORMAL UNITS
ECHO AO ROOT DIAM: 3.4 CM
ECHO AO ROOT INDEX: 1.97 CM/M2
ECHO AV AREA PEAK VELOCITY: 1.7 CM2
ECHO AV AREA/BSA PEAK VELOCITY: 1 CM2/M2
ECHO AV PEAK GRADIENT: 11 MMHG
ECHO AV PEAK VELOCITY: 1.7 M/S
ECHO AV VELOCITY RATIO: 0.76
ECHO BSA: 1.74 M2
ECHO EST RA PRESSURE: 3 MMHG
ECHO LV E' LATERAL VELOCITY: 7 CM/S
ECHO LV E' SEPTAL VELOCITY: 6 CM/S
ECHO LV EDV A4C: 33 ML
ECHO LV EDV INDEX A4C: 19 ML/M2
ECHO LV EJECTION FRACTION A4C: 55 %
ECHO LV ESV A4C: 15 ML
ECHO LV ESV INDEX A4C: 9 ML/M2
ECHO LV FRACTIONAL SHORTENING: 26 % (ref 28–44)
ECHO LV INTERNAL DIMENSION DIASTOLE INDEX: 2.2 CM/M2
ECHO LV INTERNAL DIMENSION DIASTOLIC: 3.8 CM (ref 3.9–5.3)
ECHO LV INTERNAL DIMENSION SYSTOLIC INDEX: 1.62 CM/M2
ECHO LV INTERNAL DIMENSION SYSTOLIC: 2.8 CM
ECHO LV IVSD: 1.2 CM (ref 0.6–0.9)
ECHO LV MASS 2D: 93.4 G (ref 67–162)
ECHO LV MASS INDEX 2D: 54 G/M2 (ref 43–95)
ECHO LV POSTERIOR WALL DIASTOLIC: 0.5 CM (ref 0.6–0.9)
ECHO LV RELATIVE WALL THICKNESS RATIO: 0.26
ECHO LVOT AREA: 2.3 CM2
ECHO LVOT DIAM: 1.7 CM
ECHO LVOT MEAN GRADIENT: 2 MMHG
ECHO LVOT PEAK GRADIENT: 6 MMHG
ECHO LVOT PEAK VELOCITY: 1.3 M/S
ECHO LVOT STROKE VOLUME INDEX: 32.1 ML/M2
ECHO LVOT SV: 55.6 ML
ECHO LVOT VTI: 24.5 CM
ECHO MV A VELOCITY: 1.63 M/S
ECHO MV AREA VTI: 1.9 CM2
ECHO MV E DECELERATION TIME (DT): 175 MS
ECHO MV E VELOCITY: 0.98 M/S
ECHO MV E/A RATIO: 0.6
ECHO MV E/E' LATERAL: 14
ECHO MV E/E' RATIO (AVERAGED): 15.17
ECHO MV E/E' SEPTAL: 16.33
ECHO MV LVOT VTI INDEX: 1.17
ECHO MV MAX VELOCITY: 1.8 M/S
ECHO MV MEAN GRADIENT: 4 MMHG
ECHO MV MEAN VELOCITY: 0.9 M/S
ECHO MV PEAK GRADIENT: 13 MMHG
ECHO MV REGURGITANT PEAK GRADIENT: 88 MMHG
ECHO MV REGURGITANT PEAK VELOCITY: 4.7 M/S
ECHO MV VTI: 28.7 CM
ECHO PULMONARY ARTERY END DIASTOLIC PRESSURE: 0 MMHG
ECHO PV ACCELERATION TIME (AT): 95 MS
ECHO PV MAX VELOCITY: 0.8 M/S
ECHO PV PEAK GRADIENT: 3 MMHG
ECHO PV REGURGITANT MAX VELOCITY: 0 M/S
ECHO RIGHT VENTRICULAR SYSTOLIC PRESSURE (RVSP): 35 MMHG
ECHO RV BASAL DIMENSION: 3.2 CM
ECHO RV FREE WALL PEAK S': 16 CM/S
ECHO RV TAPSE: 2.5 CM (ref 1.7–?)
ECHO TV REGURGITANT MAX VELOCITY: 2.83 M/S
ECHO TV REGURGITANT PEAK GRADIENT: 32 MMHG
EOSINOPHIL # BLD: 0 K/UL (ref 0–0.4)
EOSINOPHIL NFR BLD: 0 % (ref 0–7)
ERYTHROCYTE [DISTWIDTH] IN BLOOD BY AUTOMATED COUNT: 16.5 % (ref 11.5–14.5)
GLUCOSE BLD STRIP.AUTO-MCNC: 162 MG/DL (ref 65–100)
GLUCOSE SERPL-MCNC: 165 MG/DL (ref 65–100)
GLUCOSE SERPL-MCNC: 95 MG/DL (ref 65–100)
HCT VFR BLD AUTO: 26 % (ref 35–47)
HGB BLD-MCNC: 7.8 G/DL (ref 11.5–16)
IMM GRANULOCYTES # BLD AUTO: 0 K/UL
IMM GRANULOCYTES NFR BLD AUTO: 0 %
LACTATE SERPL-SCNC: 2.6 MMOL/L (ref 0.4–2)
LYMPHOCYTES # BLD: 1.5 K/UL (ref 0.8–3.5)
LYMPHOCYTES NFR BLD: 7 % (ref 12–49)
MAGNESIUM SERPL-MCNC: 2.4 MG/DL (ref 1.6–2.4)
MCH RBC QN AUTO: 30.8 PG (ref 26–34)
MCHC RBC AUTO-ENTMCNC: 30 G/DL (ref 30–36.5)
MCV RBC AUTO: 102.8 FL (ref 80–99)
MONOCYTES # BLD: 0.2 K/UL (ref 0–1)
MONOCYTES NFR BLD: 1 % (ref 5–13)
NEUTS SEG # BLD: 19.6 K/UL (ref 1.8–8)
NEUTS SEG NFR BLD: 92 % (ref 32–75)
NRBC # BLD: 0.03 K/UL (ref 0–0.01)
NRBC BLD-RTO: 0.1 PER 100 WBC
PERFORMED BY:: ABNORMAL
PHOSPHATE SERPL-MCNC: 2.5 MG/DL (ref 2.6–4.7)
PLATELET # BLD AUTO: 372 K/UL (ref 150–400)
PMV BLD AUTO: 10.1 FL (ref 8.9–12.9)
POTASSIUM SERPL-SCNC: 3.6 MMOL/L (ref 3.5–5.1)
POTASSIUM SERPL-SCNC: 3.6 MMOL/L (ref 3.5–5.1)
PROCALCITONIN SERPL-MCNC: 1.86 NG/ML
RBC # BLD AUTO: 2.53 M/UL (ref 3.8–5.2)
RBC MORPH BLD: ABNORMAL
SODIUM SERPL-SCNC: 155 MMOL/L (ref 136–145)
SODIUM SERPL-SCNC: 156 MMOL/L (ref 136–145)
TROPONIN I SERPL HS-MCNC: 218 NG/L (ref 0–51)
TROPONIN I SERPL HS-MCNC: 269 NG/L (ref 0–51)
VANCOMYCIN SERPL-MCNC: 12.8 UG/ML
WBC # BLD AUTO: 21.3 K/UL (ref 3.6–11)

## 2024-07-12 PROCEDURE — 6360000002 HC RX W HCPCS: Performed by: INTERNAL MEDICINE

## 2024-07-12 PROCEDURE — 6370000000 HC RX 637 (ALT 250 FOR IP): Performed by: HOSPITALIST

## 2024-07-12 PROCEDURE — 2580000003 HC RX 258: Performed by: HOSPITALIST

## 2024-07-12 PROCEDURE — 92610 EVALUATE SWALLOWING FUNCTION: CPT

## 2024-07-12 PROCEDURE — 6370000000 HC RX 637 (ALT 250 FOR IP): Performed by: INTERNAL MEDICINE

## 2024-07-12 PROCEDURE — 71045 X-RAY EXAM CHEST 1 VIEW: CPT

## 2024-07-12 PROCEDURE — 05HF33Z INSERTION OF INFUSION DEVICE INTO LEFT CEPHALIC VEIN, PERCUTANEOUS APPROACH: ICD-10-PCS | Performed by: INTERNAL MEDICINE

## 2024-07-12 PROCEDURE — 2580000003 HC RX 258: Performed by: INTERNAL MEDICINE

## 2024-07-12 PROCEDURE — 93005 ELECTROCARDIOGRAM TRACING: CPT | Performed by: INTERNAL MEDICINE

## 2024-07-12 PROCEDURE — 99232 SBSQ HOSP IP/OBS MODERATE 35: CPT | Performed by: INTERNAL MEDICINE

## 2024-07-12 PROCEDURE — 92526 ORAL FUNCTION THERAPY: CPT

## 2024-07-12 PROCEDURE — 84145 PROCALCITONIN (PCT): CPT

## 2024-07-12 PROCEDURE — 82962 GLUCOSE BLOOD TEST: CPT

## 2024-07-12 PROCEDURE — 80202 ASSAY OF VANCOMYCIN: CPT

## 2024-07-12 PROCEDURE — 80048 BASIC METABOLIC PNL TOTAL CA: CPT

## 2024-07-12 PROCEDURE — 2060000000 HC ICU INTERMEDIATE R&B

## 2024-07-12 PROCEDURE — 80069 RENAL FUNCTION PANEL: CPT

## 2024-07-12 PROCEDURE — 36415 COLL VENOUS BLD VENIPUNCTURE: CPT

## 2024-07-12 PROCEDURE — 83880 ASSAY OF NATRIURETIC PEPTIDE: CPT

## 2024-07-12 PROCEDURE — 84484 ASSAY OF TROPONIN QUANT: CPT

## 2024-07-12 PROCEDURE — 87086 URINE CULTURE/COLONY COUNT: CPT

## 2024-07-12 PROCEDURE — 85025 COMPLETE CBC W/AUTO DIFF WBC: CPT

## 2024-07-12 PROCEDURE — 2500000003 HC RX 250 WO HCPCS: Performed by: HOSPITALIST

## 2024-07-12 PROCEDURE — 86140 C-REACTIVE PROTEIN: CPT

## 2024-07-12 PROCEDURE — 6360000002 HC RX W HCPCS: Performed by: HOSPITALIST

## 2024-07-12 PROCEDURE — 36410 VNPNXR 3YR/> PHY/QHP DX/THER: CPT

## 2024-07-12 PROCEDURE — 83605 ASSAY OF LACTIC ACID: CPT

## 2024-07-12 PROCEDURE — 93306 TTE W/DOPPLER COMPLETE: CPT

## 2024-07-12 PROCEDURE — 83735 ASSAY OF MAGNESIUM: CPT

## 2024-07-12 RX ORDER — ENOXAPARIN SODIUM 100 MG/ML
40 INJECTION SUBCUTANEOUS DAILY
Status: DISCONTINUED | OUTPATIENT
Start: 2024-07-13 | End: 2024-07-12

## 2024-07-12 RX ORDER — MORPHINE SULFATE 2 MG/ML
0.5 INJECTION, SOLUTION INTRAMUSCULAR; INTRAVENOUS EVERY 4 HOURS PRN
Status: DISPENSED | OUTPATIENT
Start: 2024-07-12 | End: 2024-07-15

## 2024-07-12 RX ORDER — 0.9 % SODIUM CHLORIDE 0.9 %
500 INTRAVENOUS SOLUTION INTRAVENOUS ONCE
Status: DISCONTINUED | OUTPATIENT
Start: 2024-07-12 | End: 2024-07-12

## 2024-07-12 RX ORDER — ENOXAPARIN SODIUM 100 MG/ML
30 INJECTION SUBCUTANEOUS DAILY
Status: DISCONTINUED | OUTPATIENT
Start: 2024-07-13 | End: 2024-07-15 | Stop reason: HOSPADM

## 2024-07-12 RX ORDER — SODIUM CHLORIDE 450 MG/100ML
INJECTION, SOLUTION INTRAVENOUS CONTINUOUS
Status: DISCONTINUED | OUTPATIENT
Start: 2024-07-12 | End: 2024-07-13

## 2024-07-12 RX ADMIN — SODIUM CHLORIDE 500 ML: 9 INJECTION, SOLUTION INTRAVENOUS at 21:17

## 2024-07-12 RX ADMIN — SODIUM CHLORIDE, PRESERVATIVE FREE 10 ML: 5 INJECTION INTRAVENOUS at 21:05

## 2024-07-12 RX ADMIN — VANCOMYCIN HYDROCHLORIDE 1000 MG: 1 INJECTION, POWDER, LYOPHILIZED, FOR SOLUTION INTRAVENOUS at 22:12

## 2024-07-12 RX ADMIN — SODIUM CHLORIDE: 4.5 INJECTION, SOLUTION INTRAVENOUS at 21:45

## 2024-07-12 RX ADMIN — SODIUM CHLORIDE, PRESERVATIVE FREE 10 ML: 5 INJECTION INTRAVENOUS at 09:41

## 2024-07-12 RX ADMIN — SODIUM BICARBONATE: 84 INJECTION, SOLUTION INTRAVENOUS at 09:41

## 2024-07-12 RX ADMIN — MUPIROCIN: 20 OINTMENT TOPICAL at 09:41

## 2024-07-12 RX ADMIN — ACETAMINOPHEN 1000 MG: 500 TABLET ORAL at 09:59

## 2024-07-12 RX ADMIN — CEFTRIAXONE SODIUM 2000 MG: 2 INJECTION, POWDER, FOR SOLUTION INTRAMUSCULAR; INTRAVENOUS at 18:53

## 2024-07-12 RX ADMIN — MORPHINE SULFATE 0.5 MG: 2 INJECTION, SOLUTION INTRAMUSCULAR; INTRAVENOUS at 03:49

## 2024-07-12 RX ADMIN — CITALOPRAM 20 MG: 20 TABLET, FILM COATED ORAL at 09:59

## 2024-07-12 ASSESSMENT — PAIN DESCRIPTION - LOCATION: LOCATION: OTHER (COMMENT)

## 2024-07-12 ASSESSMENT — PAIN SCALES - WONG BAKER
WONGBAKER_NUMERICALRESPONSE: NO HURT
WONGBAKER_NUMERICALRESPONSE: NO HURT

## 2024-07-12 ASSESSMENT — PAIN DESCRIPTION - ORIENTATION: ORIENTATION: OTHER (COMMENT)

## 2024-07-12 ASSESSMENT — PAIN SCALES - GENERAL: PAINLEVEL_OUTOF10: 0

## 2024-07-12 ASSESSMENT — PAIN DESCRIPTION - DESCRIPTORS: DESCRIPTORS: OTHER (COMMENT)

## 2024-07-12 NOTE — CARE COORDINATION
DCP: return to [facility]    1047: CM met with pt and daughter at bedside to discuss dispo. Per dtr plan is for pt to return to [facility], NOT on hospice at this time. Dtr is wanting pt to remain inpt until IV abx are completed. Dtr would like to speak with ID to discuss ID plans prior to making a decision for placement at SNF vs IRF.

## 2024-07-12 NOTE — PROCEDURES
Midline Insertion Procedure Note    Procedure: Insertion of #4 FR/18G Midline    Indications:  Poor Access    Procedure Details   Verbal consent obtained from pt's children at bedside, bedside timeout performed with BASIA Rasheed.  Pt is mostly nonverbal and confused at baseline per family.  Pt noted to have a procaine allergy, primary RN inquired to attending about lidocaine for local anesthesia and provider okayed use.  Primary RN to monitor site after procedure, PRN orders for benadryl and solu medrol available.     #4 FR/18G Midline inserted to the L compressible cephalic vein using sterile MST and US per hospital protocol.   Blood return:  yes    Findings:  Catheter inserted to 10 cm, with 0 cm. Exposed. Mid upper arm circumference is 21 cm.  There were no changes to vital signs. Catheter was flushed with 10 cc NS. Patient did tolerate procedure well.    Recommendations:  Midline Brochure given to patient with teaching instructions.  Date: 7/12/2024   Name: Gena Hughes  YOB: 1928

## 2024-07-13 ENCOUNTER — APPOINTMENT (OUTPATIENT)
Facility: HOSPITAL | Age: 89
DRG: 871 | End: 2024-07-13
Payer: MEDICARE

## 2024-07-13 LAB
ALBUMIN SERPL-MCNC: 2 G/DL (ref 3.5–5)
ANION GAP SERPL CALC-SCNC: 7 MMOL/L (ref 5–15)
ARTERIAL PATENCY WRIST A: YES
BACTERIA SPEC CULT: NORMAL
BASE DEFICIT BLDA-SCNC: 3.8 MMOL/L
BASOPHILS # BLD: 0 K/UL (ref 0–0.1)
BASOPHILS NFR BLD: 0 % (ref 0–1)
BDY SITE: ABNORMAL
BODY TEMPERATURE: 97.6
BUN SERPL-MCNC: 44 MG/DL (ref 6–20)
BUN/CREAT SERPL: 39 (ref 12–20)
CA-I BLD-MCNC: 8.6 MG/DL (ref 8.5–10.1)
CHLORIDE SERPL-SCNC: 127 MMOL/L (ref 97–108)
CO2 SERPL-SCNC: 19 MMOL/L (ref 21–32)
COHGB MFR BLD: 0.3 % (ref 1–2)
CREAT SERPL-MCNC: 1.14 MG/DL (ref 0.55–1.02)
CRP SERPL-MCNC: 23.8 MG/DL (ref 0–0.3)
DATE LAST DOSE: NORMAL
DIFFERENTIAL METHOD BLD: ABNORMAL
DOSE AMOUNT: NORMAL UNITS
EKG ATRIAL RATE: 107 BPM
EKG DIAGNOSIS: NORMAL
EKG P AXIS: 35 DEGREES
EKG P-R INTERVAL: 138 MS
EKG Q-T INTERVAL: 348 MS
EKG QRS DURATION: 90 MS
EKG QTC CALCULATION (BAZETT): 464 MS
EKG R AXIS: -23 DEGREES
EKG T AXIS: 144 DEGREES
EKG VENTRICULAR RATE: 107 BPM
EOSINOPHIL # BLD: 0 K/UL (ref 0–0.4)
EOSINOPHIL NFR BLD: 0 % (ref 0–7)
ERYTHROCYTE [DISTWIDTH] IN BLOOD BY AUTOMATED COUNT: 16.4 % (ref 11.5–14.5)
FIO2 ON VENT: 100 %
GAS FLOW.O2 O2 DELIVERY SYS: 10 L/MIN
GLUCOSE BLD STRIP.AUTO-MCNC: 127 MG/DL (ref 65–100)
GLUCOSE SERPL-MCNC: 127 MG/DL (ref 65–100)
HCO3 BLDA-SCNC: 19 MMOL/L (ref 22–26)
HCT VFR BLD AUTO: 23.8 % (ref 35–47)
HGB BLD-MCNC: 7.1 G/DL (ref 11.5–16)
IMM GRANULOCYTES # BLD AUTO: 0 K/UL
IMM GRANULOCYTES NFR BLD AUTO: 0 %
LACTATE SERPL-SCNC: 2.1 MMOL/L (ref 0.4–2)
LYMPHOCYTES # BLD: 1.2 K/UL (ref 0.8–3.5)
LYMPHOCYTES NFR BLD: 5 % (ref 12–49)
Lab: NORMAL
MAGNESIUM SERPL-MCNC: 2.2 MG/DL (ref 1.6–2.4)
MCH RBC QN AUTO: 30.6 PG (ref 26–34)
MCHC RBC AUTO-ENTMCNC: 29.8 G/DL (ref 30–36.5)
MCV RBC AUTO: 102.6 FL (ref 80–99)
METHGB MFR BLD: 0.3 % (ref 0–1.4)
MONOCYTES # BLD: 0.7 K/UL (ref 0–1)
MONOCYTES NFR BLD: 3 % (ref 5–13)
NEUTS SEG # BLD: 21.6 K/UL (ref 1.8–8)
NEUTS SEG NFR BLD: 92 % (ref 32–75)
NRBC # BLD: 0 K/UL (ref 0–0.01)
NRBC BLD-RTO: 0 PER 100 WBC
OXYHGB MFR BLD: 98.5 % (ref 95–99)
PCO2 BLDA: 29 MMHG (ref 35–45)
PERFORMED BY:: ABNORMAL
PERFORMED BY:: ABNORMAL
PH BLDA: 7.45 (ref 7.35–7.45)
PHOSPHATE SERPL-MCNC: 1.9 MG/DL (ref 2.6–4.7)
PLATELET # BLD AUTO: 317 K/UL (ref 150–400)
PMV BLD AUTO: 10.5 FL (ref 8.9–12.9)
PO2 BLDA: 163 MMHG (ref 80–100)
POTASSIUM SERPL-SCNC: 3.3 MMOL/L (ref 3.5–5.1)
PROCALCITONIN SERPL-MCNC: 1.12 NG/ML
RBC # BLD AUTO: 2.32 M/UL (ref 3.8–5.2)
RBC MORPH BLD: ABNORMAL
RBC MORPH BLD: ABNORMAL
SAO2 % BLD: 99 % (ref 95–99)
SAO2% DEVICE SAO2% SENSOR NAME: ABNORMAL
SODIUM SERPL-SCNC: 153 MMOL/L (ref 136–145)
SPECIMEN SITE: ABNORMAL
TROPONIN I SERPL HS-MCNC: 274 NG/L (ref 0–51)
VANCOMYCIN SERPL-MCNC: 14.6 UG/ML
WBC # BLD AUTO: 23.5 K/UL (ref 3.6–11)

## 2024-07-13 PROCEDURE — 6370000000 HC RX 637 (ALT 250 FOR IP): Performed by: HOSPITALIST

## 2024-07-13 PROCEDURE — 6360000002 HC RX W HCPCS: Performed by: INTERNAL MEDICINE

## 2024-07-13 PROCEDURE — 80202 ASSAY OF VANCOMYCIN: CPT

## 2024-07-13 PROCEDURE — 86140 C-REACTIVE PROTEIN: CPT

## 2024-07-13 PROCEDURE — 80069 RENAL FUNCTION PANEL: CPT

## 2024-07-13 PROCEDURE — 94761 N-INVAS EAR/PLS OXIMETRY MLT: CPT

## 2024-07-13 PROCEDURE — 84145 PROCALCITONIN (PCT): CPT

## 2024-07-13 PROCEDURE — 2700000000 HC OXYGEN THERAPY PER DAY

## 2024-07-13 PROCEDURE — 2580000003 HC RX 258: Performed by: INTERNAL MEDICINE

## 2024-07-13 PROCEDURE — 2060000000 HC ICU INTERMEDIATE R&B

## 2024-07-13 PROCEDURE — 71275 CT ANGIOGRAPHY CHEST: CPT

## 2024-07-13 PROCEDURE — 83735 ASSAY OF MAGNESIUM: CPT

## 2024-07-13 PROCEDURE — 82803 BLOOD GASES ANY COMBINATION: CPT

## 2024-07-13 PROCEDURE — 36600 WITHDRAWAL OF ARTERIAL BLOOD: CPT

## 2024-07-13 PROCEDURE — 6370000000 HC RX 637 (ALT 250 FOR IP): Performed by: INTERNAL MEDICINE

## 2024-07-13 PROCEDURE — 6360000004 HC RX CONTRAST MEDICATION: Performed by: HOSPITALIST

## 2024-07-13 PROCEDURE — 2500000003 HC RX 250 WO HCPCS: Performed by: INTERNAL MEDICINE

## 2024-07-13 PROCEDURE — 6360000002 HC RX W HCPCS: Performed by: HOSPITALIST

## 2024-07-13 PROCEDURE — 2580000003 HC RX 258: Performed by: HOSPITALIST

## 2024-07-13 PROCEDURE — 2500000003 HC RX 250 WO HCPCS: Performed by: HOSPITALIST

## 2024-07-13 PROCEDURE — 82962 GLUCOSE BLOOD TEST: CPT

## 2024-07-13 PROCEDURE — 85025 COMPLETE CBC W/AUTO DIFF WBC: CPT

## 2024-07-13 PROCEDURE — 99232 SBSQ HOSP IP/OBS MODERATE 35: CPT | Performed by: INTERNAL MEDICINE

## 2024-07-13 RX ORDER — POTASSIUM CHLORIDE 7.45 MG/ML
10 INJECTION INTRAVENOUS
Status: ACTIVE | OUTPATIENT
Start: 2024-07-13 | End: 2024-07-13

## 2024-07-13 RX ORDER — POTASSIUM CHLORIDE 7.45 MG/ML
10 INJECTION INTRAVENOUS
Status: DISPENSED | OUTPATIENT
Start: 2024-07-13 | End: 2024-07-13

## 2024-07-13 RX ADMIN — IOPAMIDOL 100 ML: 755 INJECTION, SOLUTION INTRAVENOUS at 01:30

## 2024-07-13 RX ADMIN — VANCOMYCIN HYDROCHLORIDE 1000 MG: 1 INJECTION, POWDER, LYOPHILIZED, FOR SOLUTION INTRAVENOUS at 21:45

## 2024-07-13 RX ADMIN — SODIUM CHLORIDE, PRESERVATIVE FREE 10 ML: 5 INJECTION INTRAVENOUS at 21:46

## 2024-07-13 RX ADMIN — SODIUM CHLORIDE, PRESERVATIVE FREE 10 ML: 5 INJECTION INTRAVENOUS at 08:56

## 2024-07-13 RX ADMIN — MEROPENEM 1000 MG: 1 INJECTION INTRAVENOUS at 23:48

## 2024-07-13 RX ADMIN — ACETAMINOPHEN 1000 MG: 500 TABLET ORAL at 00:02

## 2024-07-13 RX ADMIN — POTASSIUM PHOSPHATE, MONOBASIC POTASSIUM PHOSPHATE, DIBASIC 15 MMOL: 224; 236 INJECTION, SOLUTION, CONCENTRATE INTRAVENOUS at 16:41

## 2024-07-13 RX ADMIN — ENOXAPARIN SODIUM 30 MG: 100 INJECTION SUBCUTANEOUS at 08:56

## 2024-07-13 RX ADMIN — MEROPENEM 1000 MG: 1 INJECTION INTRAVENOUS at 12:03

## 2024-07-13 RX ADMIN — SODIUM BICARBONATE: 84 INJECTION, SOLUTION INTRAVENOUS at 01:03

## 2024-07-13 RX ADMIN — SODIUM BICARBONATE: 84 INJECTION, SOLUTION INTRAVENOUS at 12:03

## 2024-07-13 RX ADMIN — MUPIROCIN: 20 OINTMENT TOPICAL at 21:45

## 2024-07-13 RX ADMIN — MUPIROCIN: 20 OINTMENT TOPICAL at 00:03

## 2024-07-13 ASSESSMENT — PAIN DESCRIPTION - LOCATION: LOCATION: SHOULDER

## 2024-07-13 ASSESSMENT — PAIN SCALES - GENERAL
PAINLEVEL_OUTOF10: 0
PAINLEVEL_OUTOF10: 5
PAINLEVEL_OUTOF10: 0

## 2024-07-13 ASSESSMENT — PAIN DESCRIPTION - DESCRIPTORS
DESCRIPTORS: ACHING
DESCRIPTORS: ACHING

## 2024-07-13 ASSESSMENT — PAIN SCALES - WONG BAKER
WONGBAKER_NUMERICALRESPONSE: NO HURT
WONGBAKER_NUMERICALRESPONSE: NO HURT

## 2024-07-14 LAB
ALBUMIN SERPL-MCNC: 1.7 G/DL (ref 3.5–5)
ANION GAP SERPL CALC-SCNC: 4 MMOL/L (ref 5–15)
BASOPHILS # BLD: 0 K/UL (ref 0–0.1)
BASOPHILS NFR BLD: 0 % (ref 0–1)
BUN SERPL-MCNC: 22 MG/DL (ref 6–20)
BUN/CREAT SERPL: 29 (ref 12–20)
CA-I BLD-MCNC: 7.8 MG/DL (ref 8.5–10.1)
CHLORIDE SERPL-SCNC: 122 MMOL/L (ref 97–108)
CO2 SERPL-SCNC: 24 MMOL/L (ref 21–32)
CREAT SERPL-MCNC: 0.75 MG/DL (ref 0.55–1.02)
CRP SERPL-MCNC: 17.2 MG/DL (ref 0–0.3)
DIFFERENTIAL METHOD BLD: ABNORMAL
EOSINOPHIL # BLD: 0.3 K/UL (ref 0–0.4)
EOSINOPHIL NFR BLD: 3 % (ref 0–7)
ERYTHROCYTE [DISTWIDTH] IN BLOOD BY AUTOMATED COUNT: 16.1 % (ref 11.5–14.5)
GLUCOSE SERPL-MCNC: 97 MG/DL (ref 65–100)
HCT VFR BLD AUTO: 21.1 % (ref 35–47)
HCT VFR BLD AUTO: 28.4 % (ref 35–47)
HGB BLD-MCNC: 6.4 G/DL (ref 11.5–16)
HGB BLD-MCNC: 9.1 G/DL (ref 11.5–16)
IMM GRANULOCYTES # BLD AUTO: 0.1 K/UL (ref 0–0.04)
IMM GRANULOCYTES NFR BLD AUTO: 1 % (ref 0–0.5)
LYMPHOCYTES # BLD: 0.7 K/UL (ref 0.8–3.5)
LYMPHOCYTES NFR BLD: 6 % (ref 12–49)
MAGNESIUM SERPL-MCNC: 1.8 MG/DL (ref 1.6–2.4)
MCH RBC QN AUTO: 30.5 PG (ref 26–34)
MCHC RBC AUTO-ENTMCNC: 30.3 G/DL (ref 30–36.5)
MCV RBC AUTO: 100.5 FL (ref 80–99)
MONOCYTES # BLD: 0.5 K/UL (ref 0–1)
MONOCYTES NFR BLD: 4 % (ref 5–13)
NEUTS SEG # BLD: 9.9 K/UL (ref 1.8–8)
NEUTS SEG NFR BLD: 86 % (ref 32–75)
NRBC # BLD: 0 K/UL (ref 0–0.01)
NRBC BLD-RTO: 0 PER 100 WBC
PHOSPHATE SERPL-MCNC: 2.3 MG/DL (ref 2.6–4.7)
PLATELET # BLD AUTO: 261 K/UL (ref 150–400)
PMV BLD AUTO: 10.3 FL (ref 8.9–12.9)
POTASSIUM SERPL-SCNC: 3.2 MMOL/L (ref 3.5–5.1)
PROCALCITONIN SERPL-MCNC: 0.5 NG/ML
RBC # BLD AUTO: 2.1 M/UL (ref 3.8–5.2)
RBC MORPH BLD: ABNORMAL
SODIUM SERPL-SCNC: 150 MMOL/L (ref 136–145)
WBC # BLD AUTO: 11.5 K/UL (ref 3.6–11)

## 2024-07-14 PROCEDURE — 86901 BLOOD TYPING SEROLOGIC RH(D): CPT

## 2024-07-14 PROCEDURE — 84145 PROCALCITONIN (PCT): CPT

## 2024-07-14 PROCEDURE — P9016 RBC LEUKOCYTES REDUCED: HCPCS

## 2024-07-14 PROCEDURE — 86850 RBC ANTIBODY SCREEN: CPT

## 2024-07-14 PROCEDURE — 2580000003 HC RX 258: Performed by: INTERNAL MEDICINE

## 2024-07-14 PROCEDURE — 6360000002 HC RX W HCPCS: Performed by: HOSPITALIST

## 2024-07-14 PROCEDURE — 2580000003 HC RX 258: Performed by: HOSPITALIST

## 2024-07-14 PROCEDURE — 86923 COMPATIBILITY TEST ELECTRIC: CPT

## 2024-07-14 PROCEDURE — 36430 TRANSFUSION BLD/BLD COMPNT: CPT

## 2024-07-14 PROCEDURE — 86140 C-REACTIVE PROTEIN: CPT

## 2024-07-14 PROCEDURE — 2500000003 HC RX 250 WO HCPCS: Performed by: HOSPITALIST

## 2024-07-14 PROCEDURE — 85025 COMPLETE CBC W/AUTO DIFF WBC: CPT

## 2024-07-14 PROCEDURE — 94761 N-INVAS EAR/PLS OXIMETRY MLT: CPT

## 2024-07-14 PROCEDURE — 83735 ASSAY OF MAGNESIUM: CPT

## 2024-07-14 PROCEDURE — 6370000000 HC RX 637 (ALT 250 FOR IP): Performed by: HOSPITALIST

## 2024-07-14 PROCEDURE — 6370000000 HC RX 637 (ALT 250 FOR IP): Performed by: INTERNAL MEDICINE

## 2024-07-14 PROCEDURE — 2060000000 HC ICU INTERMEDIATE R&B

## 2024-07-14 PROCEDURE — 86900 BLOOD TYPING SEROLOGIC ABO: CPT

## 2024-07-14 PROCEDURE — 2700000000 HC OXYGEN THERAPY PER DAY

## 2024-07-14 PROCEDURE — 85014 HEMATOCRIT: CPT

## 2024-07-14 PROCEDURE — 6360000002 HC RX W HCPCS: Performed by: INTERNAL MEDICINE

## 2024-07-14 PROCEDURE — 36415 COLL VENOUS BLD VENIPUNCTURE: CPT

## 2024-07-14 PROCEDURE — 85018 HEMOGLOBIN: CPT

## 2024-07-14 PROCEDURE — 80069 RENAL FUNCTION PANEL: CPT

## 2024-07-14 PROCEDURE — 30233N1 TRANSFUSION OF NONAUTOLOGOUS RED BLOOD CELLS INTO PERIPHERAL VEIN, PERCUTANEOUS APPROACH: ICD-10-PCS | Performed by: INTERNAL MEDICINE

## 2024-07-14 RX ORDER — SODIUM CHLORIDE 9 MG/ML
INJECTION, SOLUTION INTRAVENOUS PRN
Status: DISCONTINUED | OUTPATIENT
Start: 2024-07-14 | End: 2024-07-15 | Stop reason: HOSPADM

## 2024-07-14 RX ORDER — ACETAMINOPHEN 500 MG
1000 TABLET ORAL 2 TIMES DAILY
Status: ACTIVE | OUTPATIENT
Start: 2024-07-14 | End: 2024-07-15

## 2024-07-14 RX ORDER — TRAMADOL HYDROCHLORIDE 50 MG/1
50 TABLET ORAL EVERY 6 HOURS PRN
Status: ACTIVE | OUTPATIENT
Start: 2024-07-14 | End: 2024-07-15

## 2024-07-14 RX ORDER — PANTOPRAZOLE SODIUM 40 MG/10ML
40 INJECTION, POWDER, LYOPHILIZED, FOR SOLUTION INTRAVENOUS 2 TIMES DAILY
Status: DISCONTINUED | OUTPATIENT
Start: 2024-07-14 | End: 2024-07-15 | Stop reason: HOSPADM

## 2024-07-14 RX ADMIN — SODIUM CHLORIDE, PRESERVATIVE FREE 10 ML: 5 INJECTION INTRAVENOUS at 20:40

## 2024-07-14 RX ADMIN — POTASSIUM PHOSPHATE, MONOBASIC POTASSIUM PHOSPHATE, DIBASIC 30 MMOL: 224; 236 INJECTION, SOLUTION, CONCENTRATE INTRAVENOUS at 12:52

## 2024-07-14 RX ADMIN — SODIUM BICARBONATE: 84 INJECTION, SOLUTION INTRAVENOUS at 03:01

## 2024-07-14 RX ADMIN — MEROPENEM 1000 MG: 1 INJECTION INTRAVENOUS at 23:17

## 2024-07-14 RX ADMIN — MUPIROCIN: 20 OINTMENT TOPICAL at 08:21

## 2024-07-14 RX ADMIN — MEROPENEM 1000 MG: 1 INJECTION INTRAVENOUS at 12:52

## 2024-07-14 RX ADMIN — SODIUM CHLORIDE, PRESERVATIVE FREE 10 ML: 5 INJECTION INTRAVENOUS at 08:21

## 2024-07-14 RX ADMIN — MUPIROCIN: 20 OINTMENT TOPICAL at 20:40

## 2024-07-14 RX ADMIN — POTASSIUM CHLORIDE: 2 INJECTION, SOLUTION, CONCENTRATE INTRAVENOUS at 15:41

## 2024-07-14 RX ADMIN — ENOXAPARIN SODIUM 30 MG: 100 INJECTION SUBCUTANEOUS at 08:20

## 2024-07-14 RX ADMIN — PANTOPRAZOLE SODIUM 40 MG: 40 INJECTION, POWDER, FOR SOLUTION INTRAVENOUS at 20:40

## 2024-07-14 ASSESSMENT — PAIN SCALES - WONG BAKER
WONGBAKER_NUMERICALRESPONSE: NO HURT

## 2024-07-14 ASSESSMENT — PAIN SCALES - GENERAL
PAINLEVEL_OUTOF10: 0

## 2024-07-15 VITALS
HEIGHT: 65 IN | HEART RATE: 71 BPM | SYSTOLIC BLOOD PRESSURE: 125 MMHG | TEMPERATURE: 97.7 F | WEIGHT: 145 LBS | DIASTOLIC BLOOD PRESSURE: 71 MMHG | BODY MASS INDEX: 24.16 KG/M2 | OXYGEN SATURATION: 98 % | RESPIRATION RATE: 16 BRPM

## 2024-07-15 LAB
ABO + RH BLD: NORMAL
ALBUMIN SERPL-MCNC: 1.9 G/DL (ref 3.5–5)
ANION GAP SERPL CALC-SCNC: 7 MMOL/L (ref 5–15)
BLD PROD TYP BPU: NORMAL
BLOOD BANK BLOOD PRODUCT EXPIRATION DATE: NORMAL
BLOOD BANK DISPENSE STATUS: NORMAL
BLOOD BANK ISBT PRODUCT BLOOD TYPE: 5100
BLOOD BANK UNIT TYPE AND RH: NORMAL
BLOOD GROUP ANTIBODIES SERPL: NEGATIVE
BPU ID: NORMAL
BUN SERPL-MCNC: 15 MG/DL (ref 6–20)
BUN/CREAT SERPL: 24 (ref 12–20)
CA-I BLD-MCNC: 7.7 MG/DL (ref 8.5–10.1)
CHLORIDE SERPL-SCNC: 114 MMOL/L (ref 97–108)
CO2 SERPL-SCNC: 21 MMOL/L (ref 21–32)
CREAT SERPL-MCNC: 0.62 MG/DL (ref 0.55–1.02)
CROSSMATCH RESULT: NORMAL
CRP SERPL-MCNC: 14.4 MG/DL (ref 0–0.3)
DATE LAST DOSE: NORMAL
DOSE AMOUNT: NORMAL UNITS
GLUCOSE SERPL-MCNC: 89 MG/DL (ref 65–100)
HCT VFR BLD AUTO: 35.7 % (ref 35–47)
HGB BLD-MCNC: 11.7 G/DL (ref 11.5–16)
MAGNESIUM SERPL-MCNC: 1.7 MG/DL (ref 1.6–2.4)
PHOSPHATE SERPL-MCNC: 2.6 MG/DL (ref 2.6–4.7)
POTASSIUM SERPL-SCNC: 4.1 MMOL/L (ref 3.5–5.1)
PROCALCITONIN SERPL-MCNC: 0.31 NG/ML
SODIUM SERPL-SCNC: 142 MMOL/L (ref 136–145)
SPECIMEN EXP DATE BLD: NORMAL
TRANSFUSION STATUS PATIENT QL: NORMAL
UNIT DIVISION: 0
UNIT ISSUE DATE/TIME: NORMAL
VANCOMYCIN SERPL-MCNC: 11.5 UG/ML

## 2024-07-15 PROCEDURE — 99232 SBSQ HOSP IP/OBS MODERATE 35: CPT | Performed by: INTERNAL MEDICINE

## 2024-07-15 PROCEDURE — 84145 PROCALCITONIN (PCT): CPT

## 2024-07-15 PROCEDURE — 2580000003 HC RX 258: Performed by: INTERNAL MEDICINE

## 2024-07-15 PROCEDURE — 94761 N-INVAS EAR/PLS OXIMETRY MLT: CPT

## 2024-07-15 PROCEDURE — 6360000002 HC RX W HCPCS: Performed by: INTERNAL MEDICINE

## 2024-07-15 PROCEDURE — 2700000000 HC OXYGEN THERAPY PER DAY

## 2024-07-15 PROCEDURE — 86140 C-REACTIVE PROTEIN: CPT

## 2024-07-15 PROCEDURE — 85018 HEMOGLOBIN: CPT

## 2024-07-15 PROCEDURE — 83735 ASSAY OF MAGNESIUM: CPT

## 2024-07-15 PROCEDURE — 36415 COLL VENOUS BLD VENIPUNCTURE: CPT

## 2024-07-15 PROCEDURE — 85014 HEMATOCRIT: CPT

## 2024-07-15 PROCEDURE — 80069 RENAL FUNCTION PANEL: CPT

## 2024-07-15 PROCEDURE — 80202 ASSAY OF VANCOMYCIN: CPT

## 2024-07-15 RX ADMIN — VANCOMYCIN HYDROCHLORIDE 1000 MG: 1 INJECTION, POWDER, LYOPHILIZED, FOR SOLUTION INTRAVENOUS at 11:04

## 2024-07-15 RX ADMIN — SODIUM CHLORIDE, PRESERVATIVE FREE 10 ML: 5 INJECTION INTRAVENOUS at 09:00

## 2024-07-15 ASSESSMENT — PAIN SCALES - WONG BAKER: WONGBAKER_NUMERICALRESPONSE: HURTS EVEN MORE

## 2024-07-15 NOTE — CONSULTS
[facility] Renal Consult Note      NAME:  Gena Hughes   :   1928   MRN:  [id number]     Requesting Physician Milind Heck MD   Reason for Consult:  Hypernatremia     PCP:  Russ Dietz MD     Date/Time:  2024 10:28 PM          Subjective:     CHIEF COMPLAINT: Altered mental status    HISTORY OF PRESENT ILLNESS:     Ms. Hughes is a 89 y.o.   female who is admitted to the medical service with decreased responsiveness, found to be hypernatremia with acute kidney injury.  We are asked to evaluate for hypernatremia, volume depletion and acute kidney injury.    Patient admitted from skilled care facility.  Family noticed decreased responsiveness.  Brought to the emergency room.  Serum sodium was 152 associated with a markedly increased creatinine from baseline.  Treated with volume and has had a reduction in creatinine.  Sodium still remains elevated.  However, discussed with daughter at the bedside, she is more responsive although she does not talk.    Past Medical History:   Diagnosis Date    Arthritis     Asthma     allergies    History of fibromyalgia     Senile osteopenia     Spinal stenosis     Thyroid disease     hypothyroidism        Past Surgical History:   Procedure Laterality Date    HEENT  3/2008    cat. ext. O.D.    TOTAL ABDOMINAL HYSTERECTOMY W/ BILATERAL SALPINGOOPHORECTOMY  1984    cervical Ca       Social History     Tobacco Use    Smoking status: Never    Smokeless tobacco: Never   Substance Use Topics    Alcohol use: No        Family History   Problem Relation Age of Onset    Heart Disease Mother     Stroke Father         Allergies   Allergen Reactions    Doxycycline Hives    Meperidine Other (See Comments)     GI distress    Penicillins Hives    Procaine Swelling        Prior to Admission medications    Medication Sig Start Date End Date Taking? Authorizing Provider   acetaminophen (TYLENOL) 500 MG tablet Take by mouth every 8 hours as needed    
Comprehensive Nutrition Assessment    Type and Reason for Visit:  Initial, Positive Nutrition Screen, Wound, Consult (MST 2)    Nutrition Recommendations/Plan:   NPO, consider SLP consult prior to diet initiation.  Consider IVF+D5 at 100 mL/hr for muscle maintenance and Refeeding risk per ASPEN/NICE criteria.    Start supplementing with MVIs w/ trace minerals, B1, B9-- Pt is at HIGH RISK for Refeeding syndrome.    Consider discussion of nutrition goals of care-- ?ethical appropriateness for PEG feeds.    Monitor and record weights, intakes, BM in I/Os.     Malnutrition Assessment:  Malnutrition Status:  Severe malnutrition (07/11/24 1123)    Context:  Chronic Illness     Findings of the 6 clinical characteristics of malnutrition:  Energy Intake:  75% or less estimated energy requirements for 1 month or longer  Weight Loss:  Greater than 5% over 1 month     Body Fat Loss:  Severe body fat loss Triceps, Buccal region   Muscle Mass Loss:  Severe muscle mass loss Thigh (quadriceps), Clavicles (pectoralis & deltoids), Calf (gastrocnemius)  Fluid Accumulation:  No significant fluid accumulation     Strength:  Not Performed    Nutrition Assessment:    Admitted for sepsis, unresponsiveness. Screened for MST 2 d/t noted weight loss and poor intakes PTA and now low BMI. NDX=147# 3 wks ago per Pt's daughter, w/ trended loss within last 8 months. +severe wt loss(10.7% x1 mth) trended likely d/t poor intakes. Pt's daughter endorsed gradual decline in intakes starting ~2 months ago w/ Pt refusal to self-feed, further decline in last 2.5 weeks to requiring assistance w/ all meals and refusing(keeping mouth closed) meals from [facility] staff. Pt previously on MM5/Thin Liq diet, will require SLP eval if/when medically appropriate for PO diet. RD rec's adding D5 for muscle maintenance and concern for HIGH RISK for refeeding syndrome. RD now consulted for wounds; no interventions at this time. RD rec's discussion of goals of care, Pt's 
Consult Note            Date:7/11/2024        Patient Name:Gena Hughes     YOB: 1928     Age:89 y.o.    Consults Infectious Disease    Chief Complaint     Chief Complaint   Patient presents with    Altered Mental Status      Gram positive cocci bacteremia    History Obtained From   reason patient could not give history:  altered mental status    History of Present Illness      This is a 89 year old female brought to ED by EMS from [facility] after being found unresponsive. She was afebrile but her WBC was markedly elevated with elevated procal.  On exam she had right buttock, sacral and right lateral ankle wounds. Urinalysis showed marked pyuria and bacteriuria. CXR was unremarkable and CT Head showed no acute process.  Blood cultures growing Staphylococcus aureus.  No urine culture identified. Patient is currently on IV Vancomycin and Ceftriaxone.  ID has been consulted for this reason.       Patient lying in bed, appeared to be resting comfortably but was nonverbal.   Daughter at bedside stated that her mother had dementia but had stopped eating and she became concerned.  She stated that her mother was never able to complain about anything.  She does not recall her mother having a Justin catheter.    Past Medical History     Past Medical History:   Diagnosis Date    Arthritis     Asthma     allergies    History of fibromyalgia     Senile osteopenia     Spinal stenosis     Thyroid disease     hypothyroidism        Past Surgical History     Past Surgical History:   Procedure Laterality Date    HEENT  3/2008    cat. ext. O.D.    TOTAL ABDOMINAL HYSTERECTOMY W/ BILATERAL SALPINGOOPHORECTOMY  1984    cervical Ca        Medications     Prior to Admission medications    Medication Sig Start Date End Date Taking? Authorizing Provider   acetaminophen (TYLENOL) 500 MG tablet Take by mouth every 8 hours as needed    Automatic Reconciliation, Ar   citalopram (CELEXA) 20 MG tablet Take 1 tablet by mouth daily 9/2/19 
(H) 0 ng/mL   Renal Function Panel    Collection Time: 07/15/24  5:36 AM   Result Value Ref Range    Sodium 142 136 - 145 mmol/L    Potassium 4.1 3.5 - 5.1 mmol/L    Chloride 114 (H) 97 - 108 mmol/L    CO2 21 21 - 32 mmol/L    Anion Gap 7 5 - 15 mmol/L    Glucose 89 65 - 100 mg/dL    BUN 15 6 - 20 mg/dL    Creatinine 0.62 0.55 - 1.02 mg/dL    BUN/Creatinine Ratio 24 (H) 12 - 20      Est, Glom Filt Rate 81 >60 ml/min/1.73m2    Calcium 7.7 (L) 8.5 - 10.1 mg/dL    Phosphorus 2.6 2.6 - 4.7 mg/dL    Albumin 1.9 (L) 3.5 - 5.0 g/dL   Vancomycin Level, Random    Collection Time: 07/15/24  5:36 AM   Result Value Ref Range    Vancomycin Rm 11.5 ug/mL    Dose Date/Time Not provided      DOSE AMOUNT Not provided Units   Hemoglobin and Hematocrit    Collection Time: 07/15/24  6:01 AM   Result Value Ref Range    Hemoglobin 11.7 11.5 - 16.0 g/dL    Hematocrit 35.7 35.0 - 47.0 %        CTA CHEST W WO CONTRAST   Final Result      1. No acute pulmonary embolus.   2. Bilateral lower lobe airspace disease may be due to aspiration, with right   lower lobe bronchus debris.      3. Distended gallbladder. Gallstones.            Electronically signed by Loki Montejo      XR CHEST PORTABLE   Final Result   No Acute Disease.         Electronically signed by SAMI FREDERICK      XR CHEST PORTABLE   Final Result      No acute process on portable chest.         Electronically signed by MOUSTAPHA DIAL      CT Head W/O Contrast   Final Result      No acute process. Extensive chronic white matter disease and remote left   occipital infarct      Electronically signed by Loki Montejo             Assessment/Plan:     Principal Problem:    Sepsis (HCC)  Resolved Problems:    * No resolved hospital problems. *   Anemia   Rectal bleed   Patient being placed on hospice      IP WOUND CARE NURSE CONSULT TO EVAL  IP CONSULT TO PHARMACY  IP CONSULT TO INFECTIOUS DISEASES  IP CONSULT TO NEPHROLOGY  IP CONSULT TO DIETITIAN  IP CONSULT TO GI  IP CONSULT TO

## 2024-07-15 NOTE — DISCHARGE SUMMARY
Discharge Summary    Name: Gena Hughes  [id number]  YOB: 1928 (Age: 89 y.o.)   Date of Admission: 7/10/2024  Date of Discharge: 7/15/2024  Attending Physician: Lucio Jackson MD    Discharge Diagnosis:     Consultations:  IP WOUND CARE NURSE CONSULT TO EVAL  IP CONSULT TO PHARMACY  IP CONSULT TO INFECTIOUS DISEASES  IP CONSULT TO NEPHROLOGY  IP CONSULT TO DIETITIAN  IP CONSULT TO GI  IP CONSULT TO HOSPICE      Brief Admission History/Reason for Admission Per James Seipp, DO:       89 y.o. female with a past medical history as noted below presents from [facility] for decreased responsiveness.   Per Dr. Heck--------------------  The patient has been residing at [facility] for 2 years.  The patient's daughter feels she is well cared for there.  At baseline patient does not speak much and is dependent for her ADLs.  3 weeks ago the patient started to decline.  Specifically she stopped feeding herself and stopped eating as much in general.  She has also appeared more tired.  Today while the CNA was tending to the patient she noticed that she was less responsive than normal which prompted a further evaluation and subsequent transfer to the emergency department.  In the emergency department the patient was found to be septic likely due to urinary tract infection.  Given the patient's advanced age and cognitive decline goals of care were discussed with the patient's daughter (who is the POA).  Daughter notes that she would like her mother to be DNR but to otherwise receive medical treatment.        7/11/24-patient seen for the first time, chart was reviewed.  Case discussed with medical power of  daughter at bedside.  Patient has been currently sleeping in bed.  Blood cultures positive for GPC, IV Vanco started, ID consulted.  Family do want her to be treated for infection and they are considering conservative and hospice care

## 2024-07-15 NOTE — CARE COORDINATION
Transition of Care Plan:    RUR: 14%  Prior Level of Functioning:   Disposition: [facility].  Hospice via [facility].     Follow up appointments:   DME needed: N/A  Transportation at discharge: [facility] @ 13:00PM   IM/IMM Medicare/ letter given: N/A d/t hospice   Is patient a  and connected with [facility]? N/A  If yes, was [place] transfer form completed and [facility] notified?   Caregiver Contact:   Discharge Caregiver contacted prior to discharge? Daughter, at the bedside  Care Conference needed? N/A  Barriers to discharge: N/A        11:17AM Outbound F/U call to [facility] @ [phone] (memory care) unit.  No answer.  Nature of the call re: to inform of discharge today.  No answer.      FAIDA Delong    11:11AM Clinicals sent.  Patient to d/c today back to [facility] (memory care unit).  Hospice order has been sent.      Outbound call to Erica GARCIA ([facility]) @ [phone].  No answer and voice message left.  [facility] fax# [phone].  Awaiting D/C summary       FADIA Delong

## 2024-07-15 NOTE — PLAN OF CARE
Problem: Discharge Planning  Goal: Discharge to home or other facility with appropriate resources  7/12/2024 0108 by Laila Burton RN  Outcome: Progressing  7/11/2024 1211 by Bernice Chakraborty RN  Outcome: Progressing     Problem: Safety - Adult  Goal: Free from fall injury  7/12/2024 0108 by Laila Bruton RN  Outcome: Progressing  7/11/2024 1211 by Bernice Chakraborty RN  Outcome: Progressing     Problem: Pain  Goal: Verbalizes/displays adequate comfort level or baseline comfort level  7/12/2024 0108 by Laila Burton RN  Outcome: Progressing  7/11/2024 1211 by Bernice Chakraborty RN  Outcome: Progressing     Problem: Skin/Tissue Integrity  Goal: Absence of new skin breakdown  Description: 1.  Monitor for areas of redness and/or skin breakdown  2.  Assess vascular access sites hourly  3.  Every 4-6 hours minimum:  Change oxygen saturation probe site  4.  Every 4-6 hours:  If on nasal continuous positive airway pressure, respiratory therapy assess nares and determine need for appliance change or resting period.  7/12/2024 0108 by Laila Burton RN  Outcome: Progressing  7/11/2024 1211 by Bernice Chakraborty RN  Outcome: Progressing     Problem: ABCDS Injury Assessment  Goal: Absence of physical injury  7/12/2024 0108 by Laila Burton RN  Outcome: Progressing  7/11/2024 1211 by Bernice Chakraborty RN  Outcome: Progressing     Problem: Confusion  Goal: Confusion, delirium, dementia, or psychosis is improved or at baseline  Description: INTERVENTIONS:  1. Assess for possible contributors to thought disturbance, including medications, impaired vision or hearing, underlying metabolic abnormalities, dehydration, psychiatric diagnoses, and notify attending LIP  2. [place] high risk fall precautions, as indicated  3. Provide frequent short contacts to provide reality reorientation, refocusing and direction  4. Decrease environmental stimuli, including noise as appropriate  5. Monitor and intervene to 
  Problem: Discharge Planning  Goal: Discharge to home or other facility with appropriate resources  7/13/2024 1359 by Richard Cabrera RN  Outcome: Progressing  7/13/2024 0306 by Cate Clark RN  Outcome: Progressing     Problem: Safety - Adult  Goal: Free from fall injury  7/13/2024 1359 by Richard Cabrera RN  Outcome: Progressing  7/13/2024 0306 by Cate Clark RN  Outcome: Progressing     Problem: Pain  Goal: Verbalizes/displays adequate comfort level or baseline comfort level  7/13/2024 1359 by Richard Cabrera RN  Outcome: Progressing  7/13/2024 0306 by Cate Clark RN  Outcome: Progressing     Problem: Skin/Tissue Integrity  Goal: Absence of new skin breakdown  Description: 1.  Monitor for areas of redness and/or skin breakdown  2.  Assess vascular access sites hourly  3.  Every 4-6 hours minimum:  Change oxygen saturation probe site  4.  Every 4-6 hours:  If on nasal continuous positive airway pressure, respiratory therapy assess nares and determine need for appliance change or resting period.  7/13/2024 1359 by Richard Cabrera RN  Outcome: Progressing  7/13/2024 0306 by Cate Clark RN  Outcome: Progressing     Problem: ABCDS Injury Assessment  Goal: Absence of physical injury  Outcome: Progressing     Problem: Confusion  Goal: Confusion, delirium, dementia, or psychosis is improved or at baseline  Description: INTERVENTIONS:  1. Assess for possible contributors to thought disturbance, including medications, impaired vision or hearing, underlying metabolic abnormalities, dehydration, psychiatric diagnoses, and notify attending LIP  2. [place] high risk fall precautions, as indicated  3. Provide frequent short contacts to provide reality reorientation, refocusing and direction  4. Decrease environmental stimuli, including noise as appropriate  5. Monitor and intervene to maintain adequate nutrition, hydration, elimination, sleep and activity  6. If unable to ensure safety without constant 
  Problem: Discharge Planning  Goal: Discharge to home or other facility with appropriate resources  7/14/2024 0055 by Kandis De Paz RN  Outcome: Progressing  Flowsheets (Taken 7/13/2024 2140)  Discharge to home or other facility with appropriate resources: Identify barriers to discharge with patient and caregiver  7/13/2024 1359 by Richard Cabrera RN  Outcome: Progressing     Problem: Safety - Adult  Goal: Free from fall injury  7/14/2024 0055 by Kandis De Paz RN  Outcome: Progressing  7/13/2024 1359 by Richard Cabrera RN  Outcome: Progressing     Problem: Pain  Goal: Verbalizes/displays adequate comfort level or baseline comfort level  7/14/2024 0055 by Kandis De Paz RN  Outcome: Progressing  7/13/2024 1359 by Richard Cabrera RN  Outcome: Progressing     Problem: Skin/Tissue Integrity  Goal: Absence of new skin breakdown  Description: 1.  Monitor for areas of redness and/or skin breakdown  2.  Assess vascular access sites hourly  3.  Every 4-6 hours minimum:  Change oxygen saturation probe site  4.  Every 4-6 hours:  If on nasal continuous positive airway pressure, respiratory therapy assess nares and determine need for appliance change or resting period.  7/14/2024 0055 by Kandis De Paz RN  Outcome: Progressing  7/13/2024 1359 by Richard Cabrera RN  Outcome: Progressing     Problem: ABCDS Injury Assessment  Goal: Absence of physical injury  7/14/2024 0055 by Kandis De Paz RN  Outcome: Progressing  7/13/2024 1359 by Richard Cabrera RN  Outcome: Progressing     Problem: Confusion  Goal: Confusion, delirium, dementia, or psychosis is improved or at baseline  Description: INTERVENTIONS:  1. Assess for possible contributors to thought disturbance, including medications, impaired vision or hearing, underlying metabolic abnormalities, dehydration, psychiatric diagnoses, and notify attending LIP  2. [place] high risk fall precautions, as indicated  3. Provide frequent short contacts to provide 
  Problem: Discharge Planning  Goal: Discharge to home or other facility with appropriate resources  7/15/2024 1306 by Charley Clark RN  Outcome: Completed  7/15/2024 1108 by Charley Clark RN  Outcome: Adequate for Discharge     Problem: Safety - Adult  Goal: Free from fall injury  7/15/2024 1306 by Charley Clark, RN  Outcome: Completed  7/15/2024 1108 by Charley Clark RN  Outcome: Adequate for Discharge     Problem: Pain  Goal: Verbalizes/displays adequate comfort level or baseline comfort level  Outcome: Completed     Problem: Skin/Tissue Integrity  Goal: Absence of new skin breakdown  Description: 1.  Monitor for areas of redness and/or skin breakdown  2.  Assess vascular access sites hourly  3.  Every 4-6 hours minimum:  Change oxygen saturation probe site  4.  Every 4-6 hours:  If on nasal continuous positive airway pressure, respiratory therapy assess nares and determine need for appliance change or resting period.  Outcome: Completed     Problem: ABCDS Injury Assessment  Goal: Absence of physical injury  Outcome: Completed     Problem: Confusion  Goal: Confusion, delirium, dementia, or psychosis is improved or at baseline  Description: INTERVENTIONS:  1. Assess for possible contributors to thought disturbance, including medications, impaired vision or hearing, underlying metabolic abnormalities, dehydration, psychiatric diagnoses, and notify attending LIP  2. [place] high risk fall precautions, as indicated  3. Provide frequent short contacts to provide reality reorientation, refocusing and direction  4. Decrease environmental stimuli, including noise as appropriate  5. Monitor and intervene to maintain adequate nutrition, hydration, elimination, sleep and activity  6. If unable to ensure safety without constant attention obtain sitter and review sitter guidelines with assigned personnel  7. Initiate Psychosocial CNS and Spiritual Care consult, as indicated  Outcome: 
  Problem: Discharge Planning  Goal: Discharge to home or other facility with appropriate resources  Outcome: Adequate for Discharge     Problem: Safety - Adult  Goal: Free from fall injury  Outcome: Adequate for Discharge     
  Problem: Discharge Planning  Goal: Discharge to home or other facility with appropriate resources  Outcome: Progressing     Problem: Safety - Adult  Goal: Free from fall injury  Outcome: Progressing     Problem: Pain  Goal: Verbalizes/displays adequate comfort level or baseline comfort level  Outcome: Progressing     Problem: Skin/Tissue Integrity  Goal: Absence of new skin breakdown  Description: 1.  Monitor for areas of redness and/or skin breakdown  2.  Assess vascular access sites hourly  3.  Every 4-6 hours minimum:  Change oxygen saturation probe site  4.  Every 4-6 hours:  If on nasal continuous positive airway pressure, respiratory therapy assess nares and determine need for appliance change or resting period.  Outcome: Progressing     Problem: Confusion  Goal: Confusion, delirium, dementia, or psychosis is improved or at baseline  Description: INTERVENTIONS:  1. Assess for possible contributors to thought disturbance, including medications, impaired vision or hearing, underlying metabolic abnormalities, dehydration, psychiatric diagnoses, and notify attending LIP  2. [place] high risk fall precautions, as indicated  3. Provide frequent short contacts to provide reality reorientation, refocusing and direction  4. Decrease environmental stimuli, including noise as appropriate  5. Monitor and intervene to maintain adequate nutrition, hydration, elimination, sleep and activity  6. If unable to ensure safety without constant attention obtain sitter and review sitter guidelines with assigned personnel  7. Initiate Psychosocial CNS and Spiritual Care consult, as indicated  Outcome: Progressing     
visual, and tactile, all questions/concerns addressed. Daughter verbalizes understanding and demonstrates understanding.    The patient's plan of care including recommendations, planned interventions, and recommended diet changes were discussed with: Registered nurse.    Patient/family have participated as able in goal setting and plan of care and Patient/family agree to work toward stated goals and plan of care    Thank you,  [place] M.S., M.Ed., CCC-SLP  Minutes: 68

## 2024-07-16 LAB
BACTERIA SPEC CULT: ABNORMAL
GRAM STN SPEC: ABNORMAL
GRAM STN SPEC: ABNORMAL
Lab: ABNORMAL

## 2024-07-16 NOTE — PROGRESS NOTES
Hospitalist Progress Note    NAME:   Gena Hughes   : 1928   MRN: [id number]     Subjective:   Daily Progress Note:  2024    Chief complaint:  Chief Complaint   Patient presents with    Altered Mental Status         Hospital course to date/HPI from H&P:    Gena Hughes is a 89 y.o. female with a past medical history as noted below presents from [facility] for decreased responsiveness.      The patient has been residing at [facility] for 2 years.  The patient's daughter feels she is well cared for there.  At baseline patient does not speak much and is dependent for her ADLs.  3 weeks ago the patient started to decline.  Specifically she stopped feeding herself and stopped eating as much in general.  She has also appeared more tired.  Today while the CNA was tending to the patient she noticed that she was less responsive than normal which prompted a further evaluation and subsequent transfer to the emergency department.  In the emergency department the patient was found to be septic likely due to urinary tract infection.  Given the patient's advanced age and cognitive decline goals of care were discussed with the patient's daughter (who is the POA).  Daughter notes that she would like her mother to be DNR but to otherwise receive medical treatment.      24-patient seen for the first time, chart was reviewed.  Case discussed with medical power of  daughter at bedside.  Patient has been currently sleeping in bed.  Blood cultures positive for GPC, IV Vanco started, ID consulted.  Family do want her to be treated for infection and they are considering conservative and hospice care also.    24-patient seen and examined, chart was reviewed.  Discussed with daughter at bedside.  Patient to get echocardiogram per ID to rule out vegetations.  Patient sodium elevated today, creatinine improved, acidosis has resolved.  Patient remains on bicarb drip.  No 
0321 Called Erica Hurley at [facility] to give report for patient discharging, no answer left voicemail.  
1127 - Called and left voicemail for patient's daughter to call the unit and obtain blood consent.       1141 - Patient's daughter called back. Notified her of the patient's Hgb being 6.4 and that the doctor has ordered 1 unit of RBCs. Patient's daughter states that Dr. Heck called her and notified her of the blood and that she gave him consent. Had patient's daughter tell Bernice Chakraborty RN she had no questions and was giving consent so that we could fill out the blood consent form over the phone. Patient's daughter denies any questions or concerns right now.     Blood consent form signed and placed in patient's chart.     1307 -   Called lab to see if they have a tech coming to draw type and screen on patient.     1339 - Patient's daughter called back with some questions. She was asking if the blood had started. Notified her that we are still waiting on the type and screen. She was asking about an NG and the procedure of that. If is it painful. Patient's daughter states that she will think about it and talk with her family and then let us know when they arrived this evening.     1524 -  going into room to draw type and screen.     1630- Patient's daughter arrived at bedside. They as a family state that they have decided against the NG tube and is planning hospice when the patient gets back to [facility].     1646 - Called and spoke with blood bank. They are running the antibodies test now and it should be ready in about 20 minutes but they will call when the blood is ready.   
1850 Attempted to insert cardenas times 4. Left provider a VM. Pt daughter does not want to have this attempted again.  
2056: Telemetry called rapid response for patient's heart rate over 185 for a moment, irregular on monitor. Dr. Mckinnon at bedside. Patient nonverbal, does not appear in pain. EKG ordered. Dr. Mckinnon gave order for 500 ml normal saline bolus.    2102:  Patient oxygen on room air 86%, patient placed on nasal cannula. Holding off on bolus for congested cough, order for chest ray.    2103: EKG showed SVT, patient 88% on nasal cannula 3L, increased to 6L, now 97%. Dr. Mckinnon ordered labs (troponin, bmp, bnp, lactic).    2106: Noted previous instances of patient heart rate elevation in chart per telemety, however previously patient seems to have been in sinus rhythm per chart. Presently irregular.     BP 81/56    2109: xray at bedside, cycling BP: 96/63    2112: Patient placed on nonrebreather and in trendelenburg position, cycling blood pressure: 101/66, starting 500 bolus to run over 2 hours per doctor mckinnon.    2118: rapid cleared, patient receiving bolus and nonrebreather at 15 L    2123: Ordering DVT prophylaxis lovenox 40 mg daily, will notify Dr. Mckinnon of lab results        
4 Eyes Skin Assessment     NAME:  Gena Hughes  YOB: 1928  MEDICAL RECORD NUMBER:  [id number]    The patient is being assessed for  Admission    I agree that at least one RN has performed a thorough Head to Toe Skin Assessment on the patient. ALL assessment sites listed below have been assessed.      Areas assessed by both nurses:    Head, Face, Ears, Shoulders, Back, Chest, Arms, Elbows, Hands, Sacrum. Buttock, Coccyx, Ischium, Legs. Feet and Heels, and Under Medical Devices         Does the Patient have a Wound? Yes wound(s) were present on assessment. LDA wound assessment was Initiated and completed by RN       Arsen Prevention initiated by RN: Yes  Wound Care Orders initiated by RN: Yes    Pressure Injury (Stage 3,4, Unstageable, DTI, NWPT, and Complex wounds) if present, place Wound referral order by RN under : No    New Ostomies, if present place, Ostomy referral order under : No     Nurse 1 eSignature: Electronically signed by Bernice Chakraborty RN on 7/10/24 at 7:03 PM EDT    **SHARE this note so that the co-signing nurse can place an eSignature**    Nurse 2 eSignature: {Esignature:[id number]}   
7/10 - Lab called - [facility]  2 bottles of blood growing MRSA     
Attempted bedside swallow evaluation. Family at bedside reports patient not appropriate at this time s/t alertness and mentation. Daughter reported patient recently placed on puree/mildly thick diet. Discussed swallow/feeding and sensory strategies. Will re-attempt on 7/12/24.  
Attempted to see pt for PT eval; however, according to Le, RACHAEL, pt's family declining PT/OT at this time.  Pt is planning to go hospice at the facility and they do not feel like she is ready for PT/OT at this time.  Will continue to follow.  
[facility]     Renal Daily Progress Note:     Subjective: more awake but does not talk, ate ice cream but nothing substantial. Lost IV access, Creatinine down, HCO3 and Na up    Review of Systems  Review of systems not obtained due to patient factors.    Objective:     /65   Pulse 86   Temp 98.4 °F (36.9 °C) (Axillary)   Resp 16   Ht 1.651 m (5' 5\")   Wt 65.8 kg (145 lb)   SpO2 93%   BMI 24.13 kg/m²   Temp (24hrs), Av.2 °F (36.8 °C), Min:97.7 °F (36.5 °C), Max:98.6 °F (37 °C)      No intake or output data in the 24 hours ending 24 1513  Current Facility-Administered Medications   Medication Dose Route Frequency    morphine (PF) injection 0.5 mg  0.5 mg IntraVENous Q4H PRN    vancomycin (VANCOCIN) 1,000 mg in sodium chloride 0.9 % 250 mL IVPB ([id number])  1,000 mg IntraVENous Once    [START ON 2024] Vancomycin Level- Please draw level on  @1300   Other RX Placeholder    Vancomycin - Pharmacy to Dose   Other RX Placeholder    sodium bicarbonate 100 mEq in dextrose 5 % 1,000 mL infusion   IntraVENous Continuous    citalopram (CELEXA) tablet 20 mg  20 mg Oral Daily    acetaminophen (TYLENOL) tablet 1,000 mg  1,000 mg Oral BID    docusate sodium (COLACE) capsule 100 mg  100 mg Oral BID PRN    traMADol (ULTRAM) tablet 50 mg  50 mg Oral Q6H PRN    mupirocin (BACTROBAN) 2 % ointment   Each Nostril BID    sodium chloride flush 0.9 % injection 5-40 mL  5-40 mL IntraVENous 2 times per day    sodium chloride flush 0.9 % injection 5-40 mL  5-40 mL IntraVENous PRN    0.9 % sodium chloride infusion   IntraVENous PRN    ondansetron (ZOFRAN-ODT) disintegrating tablet 4 mg  4 mg Oral Q8H PRN    Or    ondansetron (ZOFRAN) injection 4 mg  4 mg IntraVENous Q6H PRN    polyethylene glycol (GLYCOLAX) packet 17 g  17 g Oral Daily PRN    acetaminophen (TYLENOL) tablet 650 mg  650 mg Oral Q6H PRN    Or    acetaminophen (TYLENOL) suppository 650 mg  650 mg Rectal Q6H PRN    cefTRIAXone (ROCEPHIN) 2,000 
[facility]     Renal Daily Progress Note:     Subjective: more awake but does not talk, ate ice cream but nothing substantial. Lost IV access, Creatinine down, HCO3 and Na up    -awake, family at bedside, Na still elevated but improving-family at bedside    Review of Systems  Review of systems not obtained due to patient factors.    Objective:     /63   Pulse 81   Temp 98.1 °F (36.7 °C) (Axillary)   Resp 12   Ht 1.651 m (5' 5\")   Wt 65.8 kg (145 lb)   SpO2 100%   BMI 24.13 kg/m²   Temp (24hrs), Av.1 °F (36.7 °C), Min:98.1 °F (36.7 °C), Max:98.2 °F (36.8 °C)        Intake/Output Summary (Last 24 hours) at 2024 1445  Last data filed at 2024 1347  Gross per 24 hour   Intake --   Output 800 ml   Net -800 ml     Current Facility-Administered Medications   Medication Dose Route Frequency    sodium bicarbonate 50 mEq in dextrose 5 % 1,000 mL infusion   IntraVENous Continuous    meropenem (MERREM) 1,000 mg in sodium chloride 0.9 % 100 mL IVPB (mini-bag)  1,000 mg IntraVENous Q12H    potassium chloride 10 mEq/100 mL IVPB (Peripheral Line)  10 mEq IntraVENous Q1H    morphine (PF) injection 0.5 mg  0.5 mg IntraVENous Q4H PRN    Vancomycin Level- Please draw level on  @1900   Other RX Placeholder    enoxaparin Sodium (LOVENOX) injection 30 mg  30 mg SubCUTAneous Daily    Vancomycin - Pharmacy to Dose   Other RX Placeholder    citalopram (CELEXA) tablet 20 mg  20 mg Oral Daily    acetaminophen (TYLENOL) tablet 1,000 mg  1,000 mg Oral BID    docusate sodium (COLACE) capsule 100 mg  100 mg Oral BID PRN    traMADol (ULTRAM) tablet 50 mg  50 mg Oral Q6H PRN    mupirocin (BACTROBAN) 2 % ointment   Each Nostril BID    sodium chloride flush 0.9 % injection 5-40 mL  5-40 mL IntraVENous 2 times per day    sodium chloride flush 0.9 % injection 5-40 mL  5-40 mL IntraVENous PRN    0.9 % sodium chloride infusion   IntraVENous PRN    ondansetron (ZOFRAN-ODT) disintegrating tablet 4 mg  4 mg Oral Q8H 
PT eval received and chart reviewed. Attempted to see patient this morning for PT eval. Patient's daughter, Shira, is requesting to hold PT and OT at this time as pt is planning to go on hospice at the facility and dtr doesn't feel the patient is currently ready for rehab services. She would like to re-initiate PT/OT orders at a later time. Thank You.  
Per chart review, pt to d/c to [facility] with hospice care. Will d/c SLP consult at this time.  
Progress Note  Date:2024       Room:[facility]  Patient Name:Gena Hughes     YOB: 1928     Age:89 y.o.        Subjective    Subjective  Patient followed for MRSA bacteremia and UTI.  Patient reportedly may have aspirated overnight. CXR last night unchanged. Urine culture is in process.  Repeat blood cultures negative so far.  She is afebrile but WBC inceased further today.  Procal and CRP decreasing.  She is on Vancomycin and Ceftriaxone.       Objective         Vitals Last 24 Hours:  TEMPERATURE:  Temp  Av.1 °F (36.7 °C)  Min: 98.1 °F (36.7 °C)  Max: 98.2 °F (36.8 °C)  RESPIRATIONS RANGE: Resp  Avg: 15.2  Min: 12  Max: 18  PULSE OXIMETRY RANGE: SpO2  Av.7 %  Min: 88 %  Max: 100 %  PULSE RANGE: Pulse  Av.7  Min: 74  Max: 114  BLOOD PRESSURE RANGE: Systolic (24hrs), Av , Min:81 , Max:140   ; Diastolic (24hrs), Av, Min:56, Max:79       Objective:  Vital signs: (most recent): Blood pressure 100/63, pulse 96, temperature 98.1 °F (36.7 °C), temperature source Axillary, resp. rate 12, height 1.651 m (5' 5\"), weight 65.8 kg (145 lb), SpO2 100 %.      Vitals and nursing note reviewed. Patient not examined today.  Constitutional:       General: She is not in acute distress.     Appearance: She is ill-appearing.   HENT: unremarkable  Cardiovascular:      Rate and Rhythm: Regular rhythm. Tachycardia present.      Heart sounds: No murmur heard.  Pulmonary:      Effort: Pulmonary effort is normal.      Breath sounds: Rhonchi present. No wheezing or rales.   Abdominal:      General: Bowel sounds are normal. There is no distension.      Palpations: Abdomen is soft.      Tenderness: There is no abdominal tenderness. There is no guarding.   Genitourinary:     Comments: No urinary device  Musculoskeletal:      Right lower leg: No edema.      Left lower leg: No edema.        Feet:    Feet:      Comments: 2 cm right lateral malleolar wound with undermining  Skin:     Findings: No rash.      
Progress Note  Date:7/16/2024       Room:[facility]  Patient Name:Gena Hughes     YOB: 1928     Age:89 y.o.        Subjective    Subjective  Patient followed for MRSA bacteremia and UTI. Urine culture was no growth. Repeat blood cultures remain negative. Right ankle wound culture also grew MRSA.  That being said, it appears that she is being discharged to hospice.      Objective         Vitals Last 24 Hours:  TEMPERATURE:  No data recorded  RESPIRATIONS RANGE: No data recorded  PULSE OXIMETRY RANGE: No data recorded  PULSE RANGE: No data recorded  BLOOD PRESSURE RANGE: No data recorded.  ; No data recorded.       Objective:  Vital signs: (most recent): Blood pressure 125/71, pulse 71, temperature 97.7 °F (36.5 °C), temperature source Axillary, resp. rate 16, height 1.651 m (5' 5\"), weight 65.8 kg (145 lb), SpO2 98 %.      Vitals and nursing note reviewed.  Patient not re-examined today.  Constitutional:       General: She is not in acute distress.     Appearance: She is ill-appearing.   HENT: unremarkable  Cardiovascular:      Rate and Rhythm: Regular rhythm. Tachycardia present.      Heart sounds: No murmur heard.  Pulmonary:      Effort: Pulmonary effort is normal.      Breath sounds: Rhonchi present. No wheezing or rales.   Abdominal:      General: Bowel sounds are normal. There is no distension.      Palpations: Abdomen is soft.      Tenderness: There is no abdominal tenderness. There is no guarding.   Genitourinary:     Comments: No urinary device  Musculoskeletal:      Right lower leg: No edema.      Left lower leg: No edema.        Feet:    Feet:      Comments: 2 cm right lateral malleolar wound with undermining  Skin:     Findings: No rash.      Comments: Right buttock wound, Stage 1, no gross signs of infection  Sacrum, Stage 2 wound, no gross signs of infection   Neurological:      General: No focal deficit present.      Mental Status: She is alert. She is disoriented.   Psychiatric:         
RENAL   Labs have corrected, patient to transfer to Hospice. Nothing to add     MD Sergei  
RRT Clinical Rounding Nurse Progress Report      SUBJECTIVE: Patient assessed secondary to elevated Deterioration Index Score.      Vitals:    07/10/24 1526 07/10/24 1530 07/10/24 1545 07/10/24 1600   BP: (!) 97/52 (!) 97/52 (!) 83/73 103/75   Pulse:       Resp:       Temp:       SpO2: 96% 96% 95% 94%   Weight:       Height:            DETERIORATION INDEX SCORE: 78    ASSESSMENT:      PLAN:  Spoke with primary RN stated patient is doing fine getting her cleaned up and settled.    Moira Lu, RN  
Rapid response called for called for tachycardia. EKG showed sinus tachycardia. Also noted hypotensive and hypoxic. Noted patient coughing, crackles on lung exam. Trendelenburg position improved BP without causing respiratory distress. Patient has no history of CHF. Noted hypernatremia, secondary to dehydration per nephrology consult. Patient non-verbal, unable to get ROS.     Chest X-ray  0.45% NaCl 125cc/h for 3 hours, to reassess BP afterwards  Continue non-rebreather mask  LA, BMP, pro-BNP, troponin.   Noted patient not on DVT prophylaxis. Given hypoxic and tachycardic, consider CTA if chest X-ray negative and persistently hypoxic.   Patient is DNR.   
ST isabella received and chart reviewed. Patient is NPO due to nurse believing she was aspirating. Patient had an x-ray that showed no changes. Patient's daughter, Shira, is requesting to hold ST eval and waiting to speak with the doctor to determine the most appropriate option for her mother. ST will try to re-initiate ST orders at a later time.   
[facility]     Renal Daily Progress Note:     Subjective: more awake but does not talk, ate ice cream but nothing substantial. Lost IV access, Creatinine down, HCO3 and Na up    -awake, family at bedside, Na still elevated but improving-    -awake. Na improving, K and phos low    Review of Systems  Review of systems not obtained due to patient factors.    Objective:     BP (!) 109/51   Pulse 74   Temp 98.6 °F (37 °C) (Axillary)   Resp 18   Ht 1.651 m (5' 5\")   Wt 65.8 kg (145 lb)   SpO2 98%   BMI 24.13 kg/m²   Temp (24hrs), Av.2 °F (36.8 °C), Min:97.9 °F (36.6 °C), Max:98.6 °F (37 °C)        Intake/Output Summary (Last 24 hours) at 2024 1330  Last data filed at 2024 1347  Gross per 24 hour   Intake --   Output 800 ml   Net -800 ml       Current Facility-Administered Medications   Medication Dose Route Frequency    0.9 % sodium chloride infusion   IntraVENous PRN    potassium phosphate 30 mmol in sodium chloride 0.9 % 500 mL IVPB  30 mmol IntraVENous Once    traMADol (ULTRAM) tablet 50 mg  50 mg Oral Q6H PRN    acetaminophen (TYLENOL) tablet 1,000 mg  1,000 mg Oral BID    sodium bicarbonate 50 mEq in dextrose 5 % 1,000 mL infusion   IntraVENous Continuous    meropenem (MERREM) 1,000 mg in sodium chloride 0.9 % 100 mL IVPB (mini-bag)  1,000 mg IntraVENous Q12H    [START ON 7/15/2024] Vancomycin - Please draw level on 7/15 @ 0600. Thanks!  1 each Other Once    morphine (PF) injection 0.5 mg  0.5 mg IntraVENous Q4H PRN    enoxaparin Sodium (LOVENOX) injection 30 mg  30 mg SubCUTAneous Daily    Vancomycin - Pharmacy to Dose   Other RX Placeholder    citalopram (CELEXA) tablet 20 mg  20 mg Oral Daily    docusate sodium (COLACE) capsule 100 mg  100 mg Oral BID PRN    mupirocin (BACTROBAN) 2 % ointment   Each Nostril BID    sodium chloride flush 0.9 % injection 5-40 mL  5-40 mL IntraVENous 2 times per day    sodium chloride flush 0.9 % injection 5-40 mL  5-40 mL IntraVENous PRN    0.9 % 
The RN called Dr. Mckinnon and notified him of the patient's new critical values of:  Troponin-274  Lactic Acid-2.1  
Vancomycin Dosing Consult  Gena Hughes is a 89 y.o. female with bloodstream infection. Pharmacy was consulted by Dr. Heck to dose and monitor vancomycin. Today is day 1.    Antibiotic regimen: Vancomycin + Ceftriaxone    Temp (24hrs), Av.2 °F (36.8 °C), Min:97.9 °F (36.6 °C), Max:98.6 °F (37 °C)    Recent Labs     07/10/24  1031 07/10/24  1043 24  0533   WBC 25.9*  --  20.0*   CREATININE 2.43* 2.12* 2.00*   BUN 71*  --  72*     Est CrCl: 15 mL/min  Concomitant nephrotoxic drugs: None    Cultures:   7/10 Blood: GPC in both bottles, prelim     MRSA Swab: Pending    Target range: Re-dose for random level <15 mcg/mL    Recent level history:  Date/Time Dose & Interval Measured Level (mcg/mL) Associated AUC/MARY              Assessment/Plan:   Significant leukocytosis, procal elevated  Scr baseline unknown. Pulse dose for now.   Will give Vancomycin 1500 mg x 1 dose.   Schedule random level for  @ 1300  Antimicrobial stop date: TBD      
Vancomycin Dosing Consult  Gena Hughes is a 89 y.o. female with bloodstream infection. Pharmacy was consulted by Dr. Heck to dose and monitor vancomycin. Today is day 2.    Antibiotic regimen: Vancomycin + Ceftriaxone    Temp (24hrs), Av.2 °F (36.8 °C), Min:97.7 °F (36.5 °C), Max:98.6 °F (37 °C)    Recent Labs     07/10/24  1031 07/10/24  1043 24  0533 24  0522 24  0523   WBC 25.9*  --  20.0* 21.3*  --    CREATININE 2.43* 2.12* 2.00*  --  1.40*   BUN 71*  --  72*  --  58*     Est CrCl: 21 mL/min  Concomitant nephrotoxic drugs: None    Cultures:   7/10 Blood: GPC in both bottles, prelim    Blood: pending    MRSA Swab: Detected     Target range: Re-dose for random level <15 mcg/mL    Recent level history:  Date/Time Dose & Interval Measured Level (mcg/mL) Associated AUC/MARY    @1238 1500 mg x 1 12.8         Assessment/Plan:   Scr is trending down.   Level resulted at 12.8. Ordered Vancomycin IV 1000 mg x 1  Schedule random level for  @1300  Antimicrobial stop date: TBD        
Vancomycin Dosing Consult  Gena Hughes is a 89 y.o. female with bloodstream infection. Pharmacy was consulted by Dr. Heck to dose and monitor vancomycin. Today is day 3.    Antibiotic regimen: Vancomycin + Meropenem    Temp (24hrs), Av.1 °F (36.7 °C), Min:97.9 °F (36.6 °C), Max:98.2 °F (36.8 °C)    Recent Labs     24  0533 24  0522 24  0523 24  2105 24  0515 24  0518   WBC 20.0* 21.3*  --   --  23.5*  --    CREATININE 2.00*  --  1.40* 1.30*  --  1.14*   BUN 72*  --  58* 54*  --  44*     Est CrCl: 26 mL/min  Concomitant nephrotoxic drugs: None    Cultures:   7/10 Blood: MRSA in both bottles, final   Blood: NGTD, prelim    Wound (foot): pending   Urine: NGTD, final    MRSA Swab: Detected     Target range: Re-dose for random level <15 mcg/mL    Recent level history:  Date/Time Dose & Interval Measured Level (mcg/mL) Associated AUC/MARY    @1238 1500 mg x 1 12.8     @ 1845 1000 mg x 1  14.6         Assessment/Plan:   Scr is trending down. Continue pulse dosing for now. If Scr remains stable consider switching to AUC based dosing.   Level ready for re-dosing. Will order Vancomycin 1000 mg x 1.  Schedule random level for 7/15 @ 0600  Antimicrobial stop date: TBD          
Vancomycin Dosing Consult  Gena Hughes is a 89 y.o. female with bloodstream infection. Pharmacy was consulted by Dr. Heck to dose and monitor vancomycin. Today is day 5.    Antibiotic regimen: Vancomycin + Meropenem    Temp (24hrs), Av °F (36.7 °C), Min:97.7 °F (36.5 °C), Max:98.2 °F (36.8 °C)    Recent Labs     24  0515 24  0518 24  0532 24  0533 07/15/24  0536   WBC 23.5*  --  11.5*  --   --    CREATININE  --  1.14*  --  0.75 0.62   BUN  --  44*  --  22* 15     Est CrCl: 48 mL/min  Concomitant nephrotoxic drugs: Contrast agents ()    Cultures:   7/10 Blood: MRSA in both bottles, final   Blood: NGTD, prelim    Wound (foot): NGTD, prelim   Urine: NGTD, final    MRSA Swab: Detected     Target range: AUC/MARY 400-600    Recent level history:  Date/Time Dose & Interval Measured Level (mcg/mL) Associated AUC/MARY    @1238 1500 mg x 1 12.8     @ 1845 1000 mg x 1  14.6    07/15 @0536 1000 mg x 1 11.5         Assessment/Plan:   Scr appears to be at baseline. Will switch to AUC guided dosing.   Ordered Vancomycin IV 1000 mg q24h, predicted  at steady state.  Schedule random level for  @0800  Labs ordered for .  Antimicrobial stop date:             
present.      Mental Status: She is alert. She is disoriented.   Psychiatric:         Behavior: Behavior normal.                     Right lateral malleolus                                                           Right buttock                                                         Sac  Labs/Imaging/Diagnostics    Labs:  CBC:  Recent Labs     07/10/24  1031 07/11/24  0533 07/12/24  0522   WBC 25.9* 20.0* 21.3*   RBC 3.04* 2.39* 2.53*   HGB 9.4* 7.4* 7.8*   HCT 30.9* 24.5* 26.0*   .6* 102.5* 102.8*   RDW 16.2* 16.2* 16.5*   * 361 372     CHEMISTRIES:  Recent Labs     07/10/24  1031 07/10/24  1043 07/11/24 0533 07/12/24  0522 07/12/24  0523   *  --  152*  --  155*   K 5.0  --  4.2  --  3.6   *  --  132*  --  127*   CO2 16*  --  12*  --  19*   BUN 71*  --  72*  --  58*   CREATININE 2.43* 2.12* 2.00*  --  1.40*   GLUCOSE 140*  --  66  --  95   PHOS  --   --  2.8  --  2.5*   MG 2.5*  --  2.4 2.4  --           Procal 1.86 <4.56      CRP 28.20      MRSA nasal PCR Positive     Blood cultures (7/10) Probable Staphylococcus aureus  Blood cultures (7/10)  Probable Staphylococcus aureus  Blood culture PCR ID panel (7/10)  STAPHYLOCOCCUS  Not detected   Detected Abnormal  [facility]   Staphylococcus Aureus  Not detected   Detected Abnormal  [facility] at [facility]   Resistant gene meca/c & mrej by PCR  Not detected   Detected Abnormal         Blood cultures (7/11) No growth after 1 day  Blood cultures (7/11) No growth after 1 day     Imaging Last 24 Hours:  No results found.    TTE (7/12)  Aortic Valve Thickened cusps. Mild regurgitation. No stenosis.   Mitral Valve Annular calcification. Calcified leaflets, cannot rule out vegetation. Mild to moderate regurgitation. No stenosis noted.   Tricuspid Valve Not well visualized. Trace regurgitation. No stenosis noted. The estimated RVSP is 35 mmHg.   Pulmonic Valve The pulmonic valve visualization is suboptimal but 
3 PI to sacrum with small area of subcutaneous exposure.  Sacrum and spine are very prominent.  No drainage.  Shear skin injury to right buttock, no drainage.     -For right lateral ankle: irrigate with saline, apply collagen Ag (moisten with saline if no drainage from wound), and cover with a gentle lite foam every three days and prn for soiling.   -For sacrum and right buttock: apply finger-tip amount of zinc paste to open areas only and cover with a sacral foam daily and prn for soiling. If soiled, remove top soiled layer only and reapply.  Use Remedy Phytoplex Barrier Cream wipes for gentle cleansing.  To completely remove, use Remedy Foaming Cleanser or soap and water.     Prevention:  Apply Optifoam Border Heel foam dressing to both heels and 6x6 Border foams to both hips/trochanters every three days to redistribute pressure from bony prominence and prevent pressure and friction injury to skin.  Rn is to gently peel back foam dressing for shift integumentary assessment and re-secure.  Maintain the external  incontinence management system to manage  incontinence.  Cover PureWick with a peripad and apply mesh panties it needed to keep in place.  Use pillows to turn/reposition q2h at 30 degree angle to offload sacrum & buttocks.  Float heels with 1-2 pillows lengthwise while in bed for offloading of the heels.  Maintain HOB at 30 degrees or less, if not contraindicated, to reduce pressure to buttocks and sacrum.  Raise foot of bed to help prevent friction and shear injury from sliding down in the bed.  Will continue to follow weekly while in-patient.      Discharge Wound Care Needs: TBD    Teaching completed with:   [] Patient           [] Family member       [] Caregiver          [] Nursing  [] Other    Patient/Caregiver Teaching:  Level of patient/caregiver understanding able to:   [] Indicates understanding       [] Needs reinforcement  [] Unsuccessful      [] Verbal Understanding  [] Demonstrated 
    PRN Meds:.docusate sodium, morphine, traMADol, sodium chloride flush, sodium chloride, ondansetron **OR** ondansetron, polyethylene glycol, acetaminophen **OR** acetaminophen      XR CHEST PORTABLE    Result Date: 7/10/2024  EXAM:  XR CHEST PORTABLE INDICATION: Confusion/AMS COMPARISON: 6/4/2020 TECHNIQUE: portable chest AP view FINDINGS: The cardiac silhouette is within normal limits. The pulmonary vasculature is within normal limits. The lungs and pleural spaces are clear. Degenerative changes right shoulder again noted.     No acute process on portable chest. Electronically signed by MOUSTAPHA DIAL    CT Head W/O Contrast    Result Date: 7/10/2024  INDICATION: ams EXAM:  HEAD CT WITHOUT CONTRAST COMPARISON: 9/21/2021 TECHNIQUE:  Routine noncontrast axial head CT was performed.  Sagittal and coronal reconstructions were generated.  CT dose reduction was achieved through use of a standardized protocol tailored for this examination and automatic exposure control for dose modulation. FINDINGS: Ventricles: Midline, no hydrocephalus. Intracranial Hemorrhage: None. Brain Parenchyma/Brainstem: Generalized parenchymal volume loss with chronic white matter hypodensities in the supratentorial brain. Chronic left occipital infarction. Basal Cisterns: Normal. Paranasal Sinuses: Visualized sinuses are clear. Additional Comments: N/A.     No acute process. Extensive chronic white matter disease and remote left occipital infarct Electronically signed by Loki Montejo        Assessment/Plan:     GWEN/hypernatremia/metabolic acidosis-  Change IV fluids to bicarb drip  Lactic normal  CO2 12  Crt 2   Nephrology consulted follow recommendation  If no improvement creatinine consider ultrasound  Considering advanced age dementia conservative care would be more recent    Severe sepsis from UTI/GPC bacteremia-  Started IV vancomycin, continue ceftriaxone  Repeated blood cultures  Follow urine cultures  ID consulted for further guidance 
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 Correlate with     Comment: clinical,epidemiologic,  and other laboratory findings  Please see BCID Interpretation Guide in EPIC Links                  Scheduled Meds:   potassium chloride  10 mEq IntraVENous Q1H    vancomycin (VANCOCIN) intermittent dosing (placeholder)   Other RX Placeholder    enoxaparin  30 mg SubCUTAneous Daily    vancomycin (VANCOCIN) intermittent dosing (placeholder)   Other RX Placeholder    citalopram  20 mg Oral Daily    acetaminophen  1,000 mg Oral BID    mupirocin   Each Nostril BID    sodium chloride flush  5-40 mL IntraVENous 2 times per day    cefTRIAXone (ROCEPHIN) IV  2,000 mg IntraVENous Q24H    lidocaine  2 patch TransDERmal Daily     Continuous Infusions:   sodium bicarbonate 50 mEq in dextrose 5 % 1,000 mL infusion      sodium chloride       PRN Meds:.morphine, docusate sodium, traMADol, sodium chloride flush, sodium chloride, ondansetron **OR** ondansetron, polyethylene glycol, acetaminophen **OR** acetaminophen      XR CHEST PORTABLE    Result Date: 7/10/2024  EXAM:  XR CHEST PORTABLE INDICATION: Confusion/AMS COMPARISON: 6/4/2020 TECHNIQUE: portable chest AP view FINDINGS: The cardiac silhouette is within normal limits. The pulmonary vasculature is within normal limits. The lungs and pleural spaces are clear. Degenerative changes right shoulder again noted.     No acute process on portable chest. Electronically 
computer voice recognition software.  Quite often unanticipated grammatical, syntax, homophones and other interpretive errors or inadvertently transcribed by the computer software.  Please excuse errors that have escaped final proofreading. Please contact me for any questions or details.  Thank you.     Signed By: Milind Heck MD     July 14, 2024

## 2024-07-17 LAB
BACTERIA SPEC CULT: NORMAL
BACTERIA SPEC CULT: NORMAL
Lab: NORMAL
Lab: NORMAL